# Patient Record
Sex: FEMALE | Race: WHITE | Employment: UNEMPLOYED | URBAN - METROPOLITAN AREA
[De-identification: names, ages, dates, MRNs, and addresses within clinical notes are randomized per-mention and may not be internally consistent; named-entity substitution may affect disease eponyms.]

---

## 2017-01-05 ENCOUNTER — TELEPHONE (OUTPATIENT)
Dept: CARDIOLOGY CLINIC | Age: 62
End: 2017-01-05

## 2017-04-17 ENCOUNTER — OFFICE VISIT (OUTPATIENT)
Dept: CARDIOLOGY CLINIC | Age: 62
End: 2017-04-17

## 2017-04-17 VITALS
WEIGHT: 192 LBS | DIASTOLIC BLOOD PRESSURE: 84 MMHG | HEART RATE: 60 BPM | SYSTOLIC BLOOD PRESSURE: 120 MMHG | HEIGHT: 61 IN | BODY MASS INDEX: 36.25 KG/M2

## 2017-04-17 DIAGNOSIS — R06.09 DOE (DYSPNEA ON EXERTION): ICD-10-CM

## 2017-04-17 DIAGNOSIS — I10 ESSENTIAL HYPERTENSION: ICD-10-CM

## 2017-04-17 DIAGNOSIS — R53.82 CHRONIC FATIGUE: ICD-10-CM

## 2017-04-17 DIAGNOSIS — E78.2 MIXED HYPERLIPIDEMIA: ICD-10-CM

## 2017-04-17 DIAGNOSIS — I48.0 PAF (PAROXYSMAL ATRIAL FIBRILLATION) (HCC): Primary | ICD-10-CM

## 2017-04-17 PROCEDURE — 99213 OFFICE O/P EST LOW 20 MIN: CPT | Performed by: INTERNAL MEDICINE

## 2017-04-17 PROCEDURE — 93000 ELECTROCARDIOGRAM COMPLETE: CPT | Performed by: INTERNAL MEDICINE

## 2018-10-12 ENCOUNTER — HOSPITAL ENCOUNTER (OUTPATIENT)
Age: 63
Setting detail: OBSERVATION
Discharge: PSYCHIATRIC OR SUBSTANCE USE FACILITY | End: 2018-10-14
Attending: EMERGENCY MEDICINE | Admitting: HOSPITALIST
Payer: COMMERCIAL

## 2018-10-12 DIAGNOSIS — T50.902A SUICIDE ATTEMPT BY DRUG INGESTION, INITIAL ENCOUNTER (HCC): Primary | ICD-10-CM

## 2018-10-12 DIAGNOSIS — N39.0 URINARY TRACT INFECTION WITHOUT HEMATURIA, SITE UNSPECIFIED: ICD-10-CM

## 2018-10-12 DIAGNOSIS — T50.902A INTENTIONAL DRUG OVERDOSE, INITIAL ENCOUNTER (HCC): ICD-10-CM

## 2018-10-12 DIAGNOSIS — F10.920 ACUTE ALCOHOLIC INTOXICATION WITHOUT COMPLICATION (HCC): ICD-10-CM

## 2018-10-12 DIAGNOSIS — K85.00 IDIOPATHIC ACUTE PANCREATITIS WITHOUT INFECTION OR NECROSIS: ICD-10-CM

## 2018-10-12 PROBLEM — T44.1X1A: Status: ACTIVE | Noted: 2018-10-12

## 2018-10-12 LAB
ACETAMINOPHEN LEVEL: <5 UG/ML (ref 15–30)
ALBUMIN SERPL-MCNC: 4.4 GM/DL (ref 3.4–5)
ALCOHOL SCREEN SERUM: 0.04 %WT/VOL
ALP BLD-CCNC: 89 IU/L (ref 40–129)
ALT SERPL-CCNC: 16 U/L (ref 10–40)
AMPHETAMINES: NEGATIVE
ANION GAP SERPL CALCULATED.3IONS-SCNC: 22 MMOL/L (ref 4–16)
AST SERPL-CCNC: 16 IU/L (ref 15–37)
BACTERIA: ABNORMAL /HPF
BARBITURATE SCREEN URINE: NEGATIVE
BASOPHILS ABSOLUTE: 0.1 K/CU MM
BASOPHILS RELATIVE PERCENT: 0.8 % (ref 0–1)
BENZODIAZEPINE SCREEN, URINE: NEGATIVE
BILIRUB SERPL-MCNC: 0.3 MG/DL (ref 0–1)
BILIRUBIN URINE: NEGATIVE MG/DL
BLOOD, URINE: NEGATIVE
BUN BLDV-MCNC: 23 MG/DL (ref 6–23)
CALCIUM SERPL-MCNC: 9 MG/DL (ref 8.3–10.6)
CANNABINOID SCREEN URINE: NEGATIVE
CAST TYPE: ABNORMAL /HPF
CHLORIDE BLD-SCNC: 102 MMOL/L (ref 99–110)
CLARITY: CLEAR
CO2: 15 MMOL/L (ref 21–32)
COCAINE METABOLITE: NEGATIVE
COLOR: YELLOW
CREAT SERPL-MCNC: 1 MG/DL (ref 0.6–1.1)
CRYSTAL TYPE: ABNORMAL /HPF
DIFFERENTIAL TYPE: ABNORMAL
EOSINOPHILS ABSOLUTE: 0.1 K/CU MM
EOSINOPHILS RELATIVE PERCENT: 1.8 % (ref 0–3)
EPITHELIAL CELLS, UA: ABNORMAL /HPF
GFR AFRICAN AMERICAN: >60 ML/MIN/1.73M2
GFR NON-AFRICAN AMERICAN: 56 ML/MIN/1.73M2
GLUCOSE BLD-MCNC: 152 MG/DL (ref 70–99)
GLUCOSE, URINE: NEGATIVE MG/DL
HCT VFR BLD CALC: 35.6 % (ref 37–47)
HEMOGLOBIN: 11.5 GM/DL (ref 12.5–16)
IMMATURE NEUTROPHIL %: 0.4 % (ref 0–0.43)
KETONES, URINE: NEGATIVE MG/DL
LEUKOCYTE ESTERASE, URINE: ABNORMAL
LIPASE: 143 IU/L (ref 13–60)
LYMPHOCYTES ABSOLUTE: 1.9 K/CU MM
LYMPHOCYTES RELATIVE PERCENT: 25.2 % (ref 24–44)
MCH RBC QN AUTO: 28.8 PG (ref 27–31)
MCHC RBC AUTO-ENTMCNC: 32.3 % (ref 32–36)
MCV RBC AUTO: 89.2 FL (ref 78–100)
MONOCYTES ABSOLUTE: 0.4 K/CU MM
MONOCYTES RELATIVE PERCENT: 4.7 % (ref 0–4)
MUCUS: NEGATIVE HPF
NITRITE URINE, QUANTITATIVE: NEGATIVE
OPIATES, URINE: NEGATIVE
OXYCODONE: NEGATIVE
PDW BLD-RTO: 12.4 % (ref 11.7–14.9)
PH, URINE: 5.5 (ref 5–8)
PHENCYCLIDINE, URINE: NEGATIVE
PLATELET # BLD: 184 K/CU MM (ref 140–440)
PMV BLD AUTO: 9.9 FL (ref 7.5–11.1)
POTASSIUM SERPL-SCNC: 4.1 MMOL/L (ref 3.5–5.1)
PROTEIN UA: NEGATIVE MG/DL
RBC # BLD: 3.99 M/CU MM (ref 4.2–5.4)
RBC URINE: ABNORMAL /HPF (ref 0–6)
SALICYLATE LEVEL: <0.3 MG/DL (ref 15–30)
SEGMENTED NEUTROPHILS ABSOLUTE COUNT: 4.9 K/CU MM
SEGMENTED NEUTROPHILS RELATIVE PERCENT: 67.1 % (ref 36–66)
SODIUM BLD-SCNC: 139 MMOL/L (ref 135–145)
SPECIFIC GRAVITY UA: 1 (ref 1–1.03)
TOTAL IMMATURE NEUTOROPHIL: 0.03 K/CU MM
TOTAL PROTEIN: 7.2 GM/DL (ref 6.4–8.2)
TSH HIGH SENSITIVITY: 1.13 UIU/ML (ref 0.27–4.2)
UROBILINOGEN, URINE: 0.2 MG/DL (ref 0.2–1)
VOLUME, (UVOL): 12 ML (ref 10–12)
WBC # BLD: 7.4 K/CU MM (ref 4–10.5)
WBC UA: ABNORMAL /HPF (ref 0–5)

## 2018-10-12 PROCEDURE — 99291 CRITICAL CARE FIRST HOUR: CPT

## 2018-10-12 PROCEDURE — 80307 DRUG TEST PRSMV CHEM ANLYZR: CPT

## 2018-10-12 PROCEDURE — 80053 COMPREHEN METABOLIC PANEL: CPT

## 2018-10-12 PROCEDURE — 6360000002 HC RX W HCPCS: Performed by: EMERGENCY MEDICINE

## 2018-10-12 PROCEDURE — 83690 ASSAY OF LIPASE: CPT

## 2018-10-12 PROCEDURE — 85025 COMPLETE CBC W/AUTO DIFF WBC: CPT

## 2018-10-12 PROCEDURE — 2580000003 HC RX 258: Performed by: EMERGENCY MEDICINE

## 2018-10-12 PROCEDURE — 6370000000 HC RX 637 (ALT 250 FOR IP)

## 2018-10-12 PROCEDURE — 96365 THER/PROPH/DIAG IV INF INIT: CPT

## 2018-10-12 PROCEDURE — 93005 ELECTROCARDIOGRAM TRACING: CPT | Performed by: EMERGENCY MEDICINE

## 2018-10-12 PROCEDURE — 84443 ASSAY THYROID STIM HORMONE: CPT

## 2018-10-12 PROCEDURE — 81001 URINALYSIS AUTO W/SCOPE: CPT

## 2018-10-12 PROCEDURE — G0480 DRUG TEST DEF 1-7 CLASSES: HCPCS

## 2018-10-12 PROCEDURE — G0378 HOSPITAL OBSERVATION PER HR: HCPCS

## 2018-10-12 PROCEDURE — 6370000000 HC RX 637 (ALT 250 FOR IP): Performed by: EMERGENCY MEDICINE

## 2018-10-12 RX ORDER — ACTIVATED CHARCOAL 208 MG/ML
25 SUSPENSION ORAL ONCE
Status: COMPLETED | OUTPATIENT
Start: 2018-10-12 | End: 2018-10-12

## 2018-10-12 RX ORDER — SODIUM CHLORIDE 9 MG/ML
INJECTION, SOLUTION INTRAVENOUS CONTINUOUS
Status: DISCONTINUED | OUTPATIENT
Start: 2018-10-12 | End: 2018-10-14 | Stop reason: HOSPADM

## 2018-10-12 RX ADMIN — POISON ADSORBENT 25 G: 50 SUSPENSION ORAL at 21:45

## 2018-10-12 RX ADMIN — ACTIVATED CHARCOAL 25 G: 208 SUSPENSION ORAL at 22:28

## 2018-10-12 RX ADMIN — CEFTRIAXONE SODIUM 1 G: 1 INJECTION, POWDER, FOR SOLUTION INTRAMUSCULAR; INTRAVENOUS at 23:18

## 2018-10-12 RX ADMIN — SODIUM CHLORIDE: 9 INJECTION, SOLUTION INTRAVENOUS at 22:38

## 2018-10-13 LAB
ALBUMIN SERPL-MCNC: 3.9 GM/DL (ref 3.4–5)
ALP BLD-CCNC: 76 IU/L (ref 40–129)
ALT SERPL-CCNC: 14 U/L (ref 10–40)
ANION GAP SERPL CALCULATED.3IONS-SCNC: 15 MMOL/L (ref 4–16)
AST SERPL-CCNC: 14 IU/L (ref 15–37)
BILIRUB SERPL-MCNC: 0.2 MG/DL (ref 0–1)
BUN BLDV-MCNC: 20 MG/DL (ref 6–23)
CALCIUM SERPL-MCNC: 8.6 MG/DL (ref 8.3–10.6)
CHLORIDE BLD-SCNC: 109 MMOL/L (ref 99–110)
CO2: 20 MMOL/L (ref 21–32)
CREAT SERPL-MCNC: 0.9 MG/DL (ref 0.6–1.1)
EKG ATRIAL RATE: 102 BPM
EKG DIAGNOSIS: NORMAL
EKG P AXIS: 27 DEGREES
EKG P-R INTERVAL: 156 MS
EKG Q-T INTERVAL: 416 MS
EKG QRS DURATION: 76 MS
EKG QTC CALCULATION (BAZETT): 542 MS
EKG R AXIS: -11 DEGREES
EKG T AXIS: 27 DEGREES
EKG VENTRICULAR RATE: 102 BPM
ESTIMATED AVERAGE GLUCOSE: 117 MG/DL
GFR AFRICAN AMERICAN: >60 ML/MIN/1.73M2
GFR NON-AFRICAN AMERICAN: >60 ML/MIN/1.73M2
GLUCOSE BLD-MCNC: 71 MG/DL (ref 70–99)
GLUCOSE BLD-MCNC: 82 MG/DL (ref 70–99)
GLUCOSE BLD-MCNC: 88 MG/DL (ref 70–99)
GLUCOSE BLD-MCNC: 92 MG/DL (ref 70–99)
GLUCOSE BLD-MCNC: 92 MG/DL (ref 70–99)
GLUCOSE BLD-MCNC: 97 MG/DL (ref 70–99)
HBA1C MFR BLD: 5.7 % (ref 4.2–6.3)
HCT VFR BLD CALC: 32.8 % (ref 37–47)
HEMOGLOBIN: 10.4 GM/DL (ref 12.5–16)
LIPASE: 108 IU/L (ref 13–60)
MCH RBC QN AUTO: 28.6 PG (ref 27–31)
MCHC RBC AUTO-ENTMCNC: 31.7 % (ref 32–36)
MCV RBC AUTO: 90.1 FL (ref 78–100)
PDW BLD-RTO: 12.4 % (ref 11.7–14.9)
PLATELET # BLD: 169 K/CU MM (ref 140–440)
PMV BLD AUTO: 9.8 FL (ref 7.5–11.1)
POTASSIUM SERPL-SCNC: 4.4 MMOL/L (ref 3.5–5.1)
RBC # BLD: 3.64 M/CU MM (ref 4.2–5.4)
SODIUM BLD-SCNC: 144 MMOL/L (ref 135–145)
TOTAL PROTEIN: 6.4 GM/DL (ref 6.4–8.2)
WBC # BLD: 6.1 K/CU MM (ref 4–10.5)

## 2018-10-13 PROCEDURE — 93010 ELECTROCARDIOGRAM REPORT: CPT | Performed by: INTERNAL MEDICINE

## 2018-10-13 PROCEDURE — G0378 HOSPITAL OBSERVATION PER HR: HCPCS

## 2018-10-13 PROCEDURE — 6360000002 HC RX W HCPCS: Performed by: NURSE PRACTITIONER

## 2018-10-13 PROCEDURE — 2580000003 HC RX 258: Performed by: NURSE PRACTITIONER

## 2018-10-13 PROCEDURE — 82962 GLUCOSE BLOOD TEST: CPT

## 2018-10-13 PROCEDURE — 6370000000 HC RX 637 (ALT 250 FOR IP): Performed by: NURSE PRACTITIONER

## 2018-10-13 PROCEDURE — 96375 TX/PRO/DX INJ NEW DRUG ADDON: CPT

## 2018-10-13 PROCEDURE — 2580000003 HC RX 258: Performed by: EMERGENCY MEDICINE

## 2018-10-13 PROCEDURE — 83036 HEMOGLOBIN GLYCOSYLATED A1C: CPT

## 2018-10-13 PROCEDURE — 2500000003 HC RX 250 WO HCPCS: Performed by: NURSE PRACTITIONER

## 2018-10-13 PROCEDURE — 85027 COMPLETE CBC AUTOMATED: CPT

## 2018-10-13 PROCEDURE — 80053 COMPREHEN METABOLIC PANEL: CPT

## 2018-10-13 PROCEDURE — 36415 COLL VENOUS BLD VENIPUNCTURE: CPT

## 2018-10-13 PROCEDURE — 87086 URINE CULTURE/COLONY COUNT: CPT

## 2018-10-13 PROCEDURE — 83690 ASSAY OF LIPASE: CPT

## 2018-10-13 RX ORDER — ACETAMINOPHEN 325 MG/1
650 TABLET ORAL EVERY 4 HOURS PRN
Status: DISCONTINUED | OUTPATIENT
Start: 2018-10-13 | End: 2018-10-14 | Stop reason: HOSPADM

## 2018-10-13 RX ORDER — DEXTROSE MONOHYDRATE 25 G/50ML
12.5 INJECTION, SOLUTION INTRAVENOUS PRN
Status: DISCONTINUED | OUTPATIENT
Start: 2018-10-13 | End: 2018-10-14 | Stop reason: HOSPADM

## 2018-10-13 RX ORDER — LISINOPRIL 40 MG/1
40 TABLET ORAL DAILY
Status: DISCONTINUED | OUTPATIENT
Start: 2018-10-13 | End: 2018-10-14 | Stop reason: HOSPADM

## 2018-10-13 RX ORDER — SODIUM CHLORIDE 0.9 % (FLUSH) 0.9 %
10 SYRINGE (ML) INJECTION EVERY 12 HOURS SCHEDULED
Status: DISCONTINUED | OUTPATIENT
Start: 2018-10-13 | End: 2018-10-14 | Stop reason: HOSPADM

## 2018-10-13 RX ORDER — DEXTROSE MONOHYDRATE 50 MG/ML
100 INJECTION, SOLUTION INTRAVENOUS PRN
Status: DISCONTINUED | OUTPATIENT
Start: 2018-10-13 | End: 2018-10-14 | Stop reason: HOSPADM

## 2018-10-13 RX ORDER — NICOTINE POLACRILEX 4 MG
15 LOZENGE BUCCAL PRN
Status: DISCONTINUED | OUTPATIENT
Start: 2018-10-13 | End: 2018-10-14 | Stop reason: HOSPADM

## 2018-10-13 RX ORDER — HYDROCHLOROTHIAZIDE 25 MG/1
12.5 TABLET ORAL DAILY
Status: DISCONTINUED | OUTPATIENT
Start: 2018-10-13 | End: 2018-10-14 | Stop reason: HOSPADM

## 2018-10-13 RX ORDER — FAMOTIDINE 20 MG/1
20 TABLET, FILM COATED ORAL DAILY
Status: DISCONTINUED | OUTPATIENT
Start: 2018-10-13 | End: 2018-10-14 | Stop reason: HOSPADM

## 2018-10-13 RX ORDER — ATORVASTATIN CALCIUM 40 MG/1
40 TABLET, FILM COATED ORAL DAILY
Status: DISCONTINUED | OUTPATIENT
Start: 2018-10-13 | End: 2018-10-14 | Stop reason: HOSPADM

## 2018-10-13 RX ORDER — ONDANSETRON 2 MG/ML
4 INJECTION INTRAMUSCULAR; INTRAVENOUS EVERY 6 HOURS PRN
Status: DISCONTINUED | OUTPATIENT
Start: 2018-10-13 | End: 2018-10-14 | Stop reason: HOSPADM

## 2018-10-13 RX ORDER — SODIUM CHLORIDE 0.9 % (FLUSH) 0.9 %
10 SYRINGE (ML) INJECTION PRN
Status: DISCONTINUED | OUTPATIENT
Start: 2018-10-13 | End: 2018-10-14 | Stop reason: HOSPADM

## 2018-10-13 RX ADMIN — FOLIC ACID: 5 INJECTION, SOLUTION INTRAMUSCULAR; INTRAVENOUS; SUBCUTANEOUS at 12:00

## 2018-10-13 RX ADMIN — SERTRALINE HYDROCHLORIDE 50 MG: 50 TABLET ORAL at 09:38

## 2018-10-13 RX ADMIN — HYDROCHLOROTHIAZIDE 12.5 MG: 25 TABLET ORAL at 09:38

## 2018-10-13 RX ADMIN — LISINOPRIL 40 MG: 40 TABLET ORAL at 09:38

## 2018-10-13 RX ADMIN — SODIUM CHLORIDE: 9 INJECTION, SOLUTION INTRAVENOUS at 02:04

## 2018-10-13 RX ADMIN — METFORMIN HYDROCHLORIDE 850 MG: 850 TABLET, FILM COATED ORAL at 09:38

## 2018-10-13 RX ADMIN — METFORMIN HYDROCHLORIDE 850 MG: 850 TABLET, FILM COATED ORAL at 17:36

## 2018-10-13 RX ADMIN — SODIUM CHLORIDE, PRESERVATIVE FREE 10 ML: 5 INJECTION INTRAVENOUS at 09:39

## 2018-10-13 RX ADMIN — FAMOTIDINE 20 MG: 20 TABLET ORAL at 09:38

## 2018-10-13 RX ADMIN — SODIUM CHLORIDE: 9 INJECTION, SOLUTION INTRAVENOUS at 09:42

## 2018-10-13 RX ADMIN — RIVAROXABAN 20 MG: 20 TABLET, FILM COATED ORAL at 17:36

## 2018-10-13 RX ADMIN — RIVAROXABAN 20 MG: 20 TABLET, FILM COATED ORAL at 01:28

## 2018-10-13 RX ADMIN — ATORVASTATIN CALCIUM 40 MG: 40 TABLET, FILM COATED ORAL at 09:38

## 2018-10-13 ASSESSMENT — LIFESTYLE VARIABLES: HISTORY_ALCOHOL_USE: NO

## 2018-10-13 ASSESSMENT — PAIN SCALES - GENERAL
PAINLEVEL_OUTOF10: 0

## 2018-10-13 ASSESSMENT — SLEEP AND FATIGUE QUESTIONNAIRES
RESTFUL SLEEP: NO
DIFFICULTY STAYING ASLEEP: YES
DO YOU HAVE DIFFICULTY SLEEPING: YES
DIFFICULTY ARISING: YES
DO YOU USE A SLEEP AID: NO
DIFFICULTY FALLING ASLEEP: YES
SLEEP PATTERN: DIFFICULTY FALLING ASLEEP

## 2018-10-13 ASSESSMENT — PATIENT HEALTH QUESTIONNAIRE - PHQ9: SUM OF ALL RESPONSES TO PHQ QUESTIONS 1-9: 14

## 2018-10-13 NOTE — ED PROVIDER NOTES
hemodynamically significant flow disturbance.  H/O echocardiogram 16    Normal left ventricular systolic function. Mild concentric LVh EF 55 to 60%     H/O exercise stress test 3/18/16     Normal test. No ischemia or arrhythmia.  History of cardiac monitoring 16    Normal event monitor findings suggestive of SR with mild degree of sinus tachycardia on April 3.    HTN (hypertension)     Hx-TIA (transient ischemic attack) 3/11/2016    2016 [facility]. MRI negative    Hyperlipidemia     Major depression 2015    PAF (paroxysmal atrial fibrillation) ([facility] [address] 3/11/2016    Telemetry strips 16 [facility]     Past Surgical History:   Procedure Laterality Date    BACK SURGERY       SECTION       Family History   Problem Relation Age of Onset    Diabetes Mother     Hypertension Father     High Cholesterol Father     Diabetes Father     Thyroid Disease Sister     Heart Disease Daughter          7 weeks old     Social History     Social History    Marital status:      Spouse name: N/A    Number of children: N/A    Years of education: N/A     Occupational History    Not on file.      Social History Main Topics    Smoking status: Never Smoker    Smokeless tobacco: Never Used    Alcohol use Yes      Comment: occasional    Drug use: No    Sexual activity: Yes     Partners: Male     Other Topics Concern    Not on file     Social History Narrative    No narrative on file     Current Facility-Administered Medications   Medication Dose Route Frequency Provider Last Rate Last Dose    0.9 % sodium chloride infusion   Intravenous Continuous Nichol Urbina  mL/hr at 10/12/18 0421      cefTRIAXone (ROCEPHIN) 1 g IVPB in 50 mL D5W minibag  1 g Intravenous Once Lida Guerrero DO         Current Outpatient Prescriptions   Medication Sig Dispense Refill    medroxyPROGESTERone (PROVERA) 10 MG tablet 1 tab PO QD 5 tablet 0    hydrochlorothiazide the chart, and lab studies. This critical care time does not include any billable procedures. Clinical Impression:  1. Suicide attempt by drug ingestion, initial encounter ([facility] [address]    2. Intentional drug overdose, initial encounter ([facility]    3. Idiopathic acute pancreatitis without infection or necrosis    4. Acute alcoholic intoxication without complication ([facility]    5. Urinary tract infection without hematuria, site unspecified      Disposition referral (if applicable):  No follow-up provider specified. Disposition medications (if applicable):  New Prescriptions    No medications on file       Comment: Please note this report has been produced using speech recognition software and may contain errors related to that system including errors in grammar, punctuation, and spelling, as well as words and phrases that may be inappropriate. If there are any questions or concerns please feel free to contact the dictating provider for clarification.         [address] Tita, DO  10/12/18 2329

## 2018-10-13 NOTE — ED NOTES
Pt states that she wants some sleeping pills and wants to go to sleep.      Maddy Roca RN  10/12/18 9580

## 2018-10-14 VITALS
TEMPERATURE: 97 F | WEIGHT: 173 LBS | HEIGHT: 61 IN | OXYGEN SATURATION: 97 % | DIASTOLIC BLOOD PRESSURE: 77 MMHG | BODY MASS INDEX: 32.66 KG/M2 | RESPIRATION RATE: 12 BRPM | SYSTOLIC BLOOD PRESSURE: 142 MMHG | HEART RATE: 54 BPM

## 2018-10-14 LAB
ANION GAP SERPL CALCULATED.3IONS-SCNC: 16 MMOL/L (ref 4–16)
BUN BLDV-MCNC: 17 MG/DL (ref 6–23)
CALCIUM SERPL-MCNC: 8 MG/DL (ref 8.3–10.6)
CHLORIDE BLD-SCNC: 111 MMOL/L (ref 99–110)
CO2: 18 MMOL/L (ref 21–32)
CREAT SERPL-MCNC: 0.9 MG/DL (ref 0.6–1.1)
EKG ATRIAL RATE: 57 BPM
EKG DIAGNOSIS: NORMAL
EKG P AXIS: 49 DEGREES
EKG P-R INTERVAL: 180 MS
EKG Q-T INTERVAL: 428 MS
EKG QRS DURATION: 80 MS
EKG QTC CALCULATION (BAZETT): 416 MS
EKG R AXIS: -7 DEGREES
EKG T AXIS: 23 DEGREES
EKG VENTRICULAR RATE: 57 BPM
GFR AFRICAN AMERICAN: >60 ML/MIN/1.73M2
GFR NON-AFRICAN AMERICAN: >60 ML/MIN/1.73M2
GLUCOSE BLD-MCNC: 87 MG/DL (ref 70–99)
GLUCOSE BLD-MCNC: 88 MG/DL (ref 70–99)
LIPASE: 60 IU/L (ref 13–60)
POTASSIUM SERPL-SCNC: 4 MMOL/L (ref 3.5–5.1)
SODIUM BLD-SCNC: 145 MMOL/L (ref 135–145)

## 2018-10-14 PROCEDURE — 83690 ASSAY OF LIPASE: CPT

## 2018-10-14 PROCEDURE — 2580000003 HC RX 258: Performed by: EMERGENCY MEDICINE

## 2018-10-14 PROCEDURE — 93005 ELECTROCARDIOGRAM TRACING: CPT | Performed by: HOSPITALIST

## 2018-10-14 PROCEDURE — 82962 GLUCOSE BLOOD TEST: CPT

## 2018-10-14 PROCEDURE — 6370000000 HC RX 637 (ALT 250 FOR IP): Performed by: NURSE PRACTITIONER

## 2018-10-14 PROCEDURE — G0378 HOSPITAL OBSERVATION PER HR: HCPCS

## 2018-10-14 PROCEDURE — 36415 COLL VENOUS BLD VENIPUNCTURE: CPT

## 2018-10-14 PROCEDURE — 80048 BASIC METABOLIC PNL TOTAL CA: CPT

## 2018-10-14 PROCEDURE — 93010 ELECTROCARDIOGRAM REPORT: CPT | Performed by: INTERNAL MEDICINE

## 2018-10-14 RX ADMIN — HYDROCHLOROTHIAZIDE 12.5 MG: 25 TABLET ORAL at 08:36

## 2018-10-14 RX ADMIN — METFORMIN HYDROCHLORIDE 850 MG: 850 TABLET, FILM COATED ORAL at 08:36

## 2018-10-14 RX ADMIN — LISINOPRIL 40 MG: 40 TABLET ORAL at 08:36

## 2018-10-14 RX ADMIN — SERTRALINE HYDROCHLORIDE 50 MG: 50 TABLET ORAL at 08:37

## 2018-10-14 RX ADMIN — SODIUM CHLORIDE: 9 INJECTION, SOLUTION INTRAVENOUS at 02:54

## 2018-10-14 RX ADMIN — ATORVASTATIN CALCIUM 40 MG: 40 TABLET, FILM COATED ORAL at 08:36

## 2018-10-14 RX ADMIN — FAMOTIDINE 20 MG: 20 TABLET ORAL at 08:36

## 2018-10-14 ASSESSMENT — PAIN SCALES - GENERAL
PAINLEVEL_OUTOF10: 0

## 2018-10-14 NOTE — DISCHARGE SUMMARY
elevated lipase, which was likely due to her alcohol use. She was not having any abdominal pain or nausea or vomiting. This was likely not pancreatitis. She was tolerating her diet. Lipase was improving during the course of her stay. Patient had an abnormal UA. Leukocyte esterase was positive. However patient did not have any dysuria, nor bladder tenderness. She likely had asymptomatic bacteriuria. All antibiotics were stopped. Patient had diabetes, and was on metformin and sliding scale insulin. Her hemoglobin A1c was 5.7. Patient was stable and she was transferred to inpatient mental health. Physical Exam:  BP (!) 142/77   Pulse 56   Temp 97 °F (36.1 °C) (Temporal)   Resp 12   Ht 5' 1\" (1.549 m)   Wt 173 lb (78.5 kg)   LMP 01/14/2015 (Approximate)   SpO2 98%   Breastfeeding? No   BMI 32.69 kg/m²   CONSTITUTIONAL:  No acute distress  HENT:  NCAT  LUNGS:  cta b/l bs+  CARDIOVASCULAR:  s1s2 rrr  ABDOMEN:  Soft nt nd  MUSCULOSKELETAL/Extremities:  No edema, nontender  NEUROLOGIC:  No gross deficits, aao  SKIN:  No gross lesions    Consultations  IP CONSULT TO PSYCHIATRY  IP CONSULT TO SOCIAL WORK    Invasive procedures:   none    Significant Diagnostic Studies:    Imaging  No results found. Code Status:  Full Code      Discharge Medications:     Medication List      CONTINUE taking these medications    atorvastatin 40 MG tablet  Commonly known as:  LIPITOR  Take 1 tablet by mouth daily. hydrochlorothiazide 12.5 MG tablet  Commonly known as:  HYDRODIURIL  Take 1 tablet by mouth daily     lisinopril 40 MG tablet  Commonly known as:  PRINIVIL;ZESTRIL  Take 1 tablet by mouth daily. metFORMIN 850 MG tablet  Commonly known as:  GLUCOPHAGE  Take 1 tablet by mouth 2 times daily (with meals).      rivaroxaban 20 MG Tabs tablet  Commonly known as:  XARELTO     sertraline 50 MG tablet  Commonly known as:  ZOLOFT        STOP taking these medications    medroxyPROGESTERone 10 MG

## 2018-10-14 NOTE — FLOWSHEET NOTE
Received approval from [facility] Tzee per Sharla Ware that patient was approved for a bed. Per Dr. Rachel Titus we are to continue the plan to place as an inpatient and patient continues to be pink slipped. Felipa supervisor notified. [facility] notified of approval and asked to set up transportation. Tanesha Teran will call back with more information.
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
## 2018-10-15 LAB
CULTURE: NORMAL
Lab: NORMAL
REPORT STATUS: NORMAL
SPECIMEN: NORMAL

## 2021-08-26 ENCOUNTER — HOSPITAL ENCOUNTER (INPATIENT)
Age: 66
LOS: 2 days | Discharge: HOME | DRG: 392 | End: 2021-08-28
Attending: EMERGENCY MEDICINE | Admitting: HOSPITALIST
Payer: MEDICARE

## 2021-08-26 ENCOUNTER — APPOINTMENT (OUTPATIENT)
Dept: CT IMAGING | Age: 66
DRG: 392 | End: 2021-08-26
Payer: MEDICARE

## 2021-08-26 DIAGNOSIS — K57.92 ACUTE DIVERTICULITIS: Primary | ICD-10-CM

## 2021-08-26 DIAGNOSIS — R11.2 NAUSEA AND VOMITING, INTRACTABILITY OF VOMITING NOT SPECIFIED, UNSPECIFIED VOMITING TYPE: ICD-10-CM

## 2021-08-26 LAB
ALBUMIN SERPL-MCNC: 4 GM/DL (ref 3.4–5)
ALP BLD-CCNC: 75 IU/L (ref 40–129)
ALT SERPL-CCNC: 24 U/L (ref 10–40)
ANION GAP SERPL CALCULATED.3IONS-SCNC: 20 MMOL/L (ref 4–16)
AST SERPL-CCNC: 24 IU/L (ref 15–37)
BACTERIA: ABNORMAL /HPF
BASOPHILS ABSOLUTE: 0 K/CU MM
BASOPHILS RELATIVE PERCENT: 0.2 % (ref 0–1)
BILIRUB SERPL-MCNC: 0.4 MG/DL (ref 0–1)
BILIRUBIN URINE: NEGATIVE MG/DL
BLOOD, URINE: NEGATIVE
BUN BLDV-MCNC: 42 MG/DL (ref 6–23)
CALCIUM SERPL-MCNC: 8.6 MG/DL (ref 8.3–10.6)
CAST TYPE: ABNORMAL /HPF
CHLORIDE BLD-SCNC: 92 MMOL/L (ref 99–110)
CLARITY: ABNORMAL
CO2: 20 MMOL/L (ref 21–32)
COLOR: YELLOW
CREAT SERPL-MCNC: 1.5 MG/DL (ref 0.6–1.1)
CRYSTAL TYPE: ABNORMAL /HPF
DIFFERENTIAL TYPE: ABNORMAL
EOSINOPHILS ABSOLUTE: 0.3 K/CU MM
EOSINOPHILS RELATIVE PERCENT: 3.2 % (ref 0–3)
EPITHELIAL CELLS, UA: ABNORMAL /HPF
GFR AFRICAN AMERICAN: 42 ML/MIN/1.73M2
GFR NON-AFRICAN AMERICAN: 35 ML/MIN/1.73M2
GLUCOSE BLD-MCNC: 171 MG/DL (ref 70–99)
GLUCOSE, URINE: NEGATIVE MG/DL
HCT VFR BLD CALC: 32.8 % (ref 37–47)
HEMOGLOBIN: 10.8 GM/DL (ref 12.5–16)
IMMATURE NEUTROPHIL %: 0.6 % (ref 0–0.43)
KETONES, URINE: ABNORMAL MG/DL
LACTATE: 1.8 MMOL/L (ref 0.4–2)
LEUKOCYTE ESTERASE, URINE: ABNORMAL
LIPASE: 19 IU/L (ref 13–60)
LYMPHOCYTES ABSOLUTE: 0.4 K/CU MM
LYMPHOCYTES RELATIVE PERCENT: 4.2 % (ref 24–44)
MCH RBC QN AUTO: 28 PG (ref 27–31)
MCHC RBC AUTO-ENTMCNC: 32.9 % (ref 32–36)
MCV RBC AUTO: 85 FL (ref 78–100)
MONOCYTES ABSOLUTE: 0.3 K/CU MM
MONOCYTES RELATIVE PERCENT: 3.3 % (ref 0–4)
MUCUS: NEGATIVE HPF
NITRITE URINE, QUANTITATIVE: NEGATIVE
PDW BLD-RTO: 14.7 % (ref 11.7–14.9)
PH, URINE: 5 (ref 5–8)
PLATELET # BLD: 211 K/CU MM (ref 140–440)
PMV BLD AUTO: 10.4 FL (ref 7.5–11.1)
POTASSIUM SERPL-SCNC: 3.7 MMOL/L (ref 3.5–5.1)
PROTEIN UA: ABNORMAL MG/DL
RBC # BLD: 3.86 M/CU MM (ref 4.2–5.4)
RBC URINE: ABNORMAL /HPF (ref 0–6)
SEGMENTED NEUTROPHILS ABSOLUTE COUNT: 7.8 K/CU MM
SEGMENTED NEUTROPHILS RELATIVE PERCENT: 88.5 % (ref 36–66)
SODIUM BLD-SCNC: 132 MMOL/L (ref 135–145)
SPECIFIC GRAVITY UA: 1.01 (ref 1–1.03)
TOTAL IMMATURE NEUTOROPHIL: 0.05 K/CU MM
TOTAL PROTEIN: 6.6 GM/DL (ref 6.4–8.2)
UROBILINOGEN, URINE: 0.2 MG/DL (ref 0.2–1)
VOLUME, (UVOL): 12 ML (ref 10–12)
WBC # BLD: 8.8 K/CU MM (ref 4–10.5)
WBC UA: ABNORMAL /HPF (ref 0–5)

## 2021-08-26 PROCEDURE — 6360000002 HC RX W HCPCS: Performed by: EMERGENCY MEDICINE

## 2021-08-26 PROCEDURE — 1200000000 HC SEMI PRIVATE

## 2021-08-26 PROCEDURE — 96375 TX/PRO/DX INJ NEW DRUG ADDON: CPT

## 2021-08-26 PROCEDURE — G0378 HOSPITAL OBSERVATION PER HR: HCPCS

## 2021-08-26 PROCEDURE — 2500000003 HC RX 250 WO HCPCS: Performed by: EMERGENCY MEDICINE

## 2021-08-26 PROCEDURE — 80053 COMPREHEN METABOLIC PANEL: CPT

## 2021-08-26 PROCEDURE — 99284 EMERGENCY DEPT VISIT MOD MDM: CPT

## 2021-08-26 PROCEDURE — 96365 THER/PROPH/DIAG IV INF INIT: CPT

## 2021-08-26 PROCEDURE — 96374 THER/PROPH/DIAG INJ IV PUSH: CPT

## 2021-08-26 PROCEDURE — 96368 THER/DIAG CONCURRENT INF: CPT

## 2021-08-26 PROCEDURE — 2580000003 HC RX 258: Performed by: EMERGENCY MEDICINE

## 2021-08-26 PROCEDURE — 96361 HYDRATE IV INFUSION ADD-ON: CPT

## 2021-08-26 PROCEDURE — 81001 URINALYSIS AUTO W/SCOPE: CPT

## 2021-08-26 PROCEDURE — 85025 COMPLETE CBC W/AUTO DIFF WBC: CPT

## 2021-08-26 PROCEDURE — 74176 CT ABD & PELVIS W/O CONTRAST: CPT

## 2021-08-26 PROCEDURE — 83605 ASSAY OF LACTIC ACID: CPT

## 2021-08-26 PROCEDURE — 83690 ASSAY OF LIPASE: CPT

## 2021-08-26 RX ORDER — ONDANSETRON 2 MG/ML
4 INJECTION INTRAMUSCULAR; INTRAVENOUS EVERY 6 HOURS PRN
Status: DISCONTINUED | OUTPATIENT
Start: 2021-08-26 | End: 2021-08-28 | Stop reason: HOSPADM

## 2021-08-26 RX ORDER — ATORVASTATIN CALCIUM 40 MG/1
40 TABLET, FILM COATED ORAL DAILY
Status: DISCONTINUED | OUTPATIENT
Start: 2021-08-27 | End: 2021-08-28 | Stop reason: HOSPADM

## 2021-08-26 RX ORDER — CIPROFLOXACIN 2 MG/ML
400 INJECTION, SOLUTION INTRAVENOUS ONCE
Status: COMPLETED | OUTPATIENT
Start: 2021-08-26 | End: 2021-08-27

## 2021-08-26 RX ORDER — 0.9 % SODIUM CHLORIDE 0.9 %
1000 INTRAVENOUS SOLUTION INTRAVENOUS ONCE
Status: COMPLETED | OUTPATIENT
Start: 2021-08-26 | End: 2021-08-26

## 2021-08-26 RX ORDER — ONDANSETRON 4 MG/1
4 TABLET, ORALLY DISINTEGRATING ORAL EVERY 8 HOURS PRN
Status: DISCONTINUED | OUTPATIENT
Start: 2021-08-26 | End: 2021-08-28 | Stop reason: HOSPADM

## 2021-08-26 RX ORDER — HYDROCHLOROTHIAZIDE 12.5 MG/1
12.5 CAPSULE, GELATIN COATED ORAL DAILY
Status: DISCONTINUED | OUTPATIENT
Start: 2021-08-27 | End: 2021-08-28 | Stop reason: HOSPADM

## 2021-08-26 RX ORDER — SODIUM CHLORIDE 9 MG/ML
25 INJECTION, SOLUTION INTRAVENOUS PRN
Status: DISCONTINUED | OUTPATIENT
Start: 2021-08-26 | End: 2021-08-28 | Stop reason: HOSPADM

## 2021-08-26 RX ORDER — ACETAMINOPHEN 325 MG/1
650 TABLET ORAL EVERY 6 HOURS PRN
Status: DISCONTINUED | OUTPATIENT
Start: 2021-08-26 | End: 2021-08-28 | Stop reason: HOSPADM

## 2021-08-26 RX ORDER — ONDANSETRON 2 MG/ML
4 INJECTION INTRAMUSCULAR; INTRAVENOUS ONCE
Status: COMPLETED | OUTPATIENT
Start: 2021-08-26 | End: 2021-08-26

## 2021-08-26 RX ORDER — LISINOPRIL 40 MG/1
40 TABLET ORAL DAILY
Status: DISCONTINUED | OUTPATIENT
Start: 2021-08-27 | End: 2021-08-28 | Stop reason: HOSPADM

## 2021-08-26 RX ORDER — METOCLOPRAMIDE HYDROCHLORIDE 5 MG/ML
10 INJECTION INTRAMUSCULAR; INTRAVENOUS ONCE
Status: COMPLETED | OUTPATIENT
Start: 2021-08-26 | End: 2021-08-27

## 2021-08-26 RX ORDER — SODIUM CHLORIDE 0.9 % (FLUSH) 0.9 %
5-40 SYRINGE (ML) INJECTION 2 TIMES DAILY
Status: DISCONTINUED | OUTPATIENT
Start: 2021-08-27 | End: 2021-08-28 | Stop reason: HOSPADM

## 2021-08-26 RX ORDER — SODIUM CHLORIDE 0.9 % (FLUSH) 0.9 %
5-40 SYRINGE (ML) INJECTION PRN
Status: DISCONTINUED | OUTPATIENT
Start: 2021-08-26 | End: 2021-08-28 | Stop reason: HOSPADM

## 2021-08-26 RX ORDER — POLYETHYLENE GLYCOL 3350 17 G/17G
17 POWDER, FOR SOLUTION ORAL DAILY PRN
Status: DISCONTINUED | OUTPATIENT
Start: 2021-08-26 | End: 2021-08-28 | Stop reason: HOSPADM

## 2021-08-26 RX ORDER — ACETAMINOPHEN 650 MG/1
650 SUPPOSITORY RECTAL EVERY 6 HOURS PRN
Status: DISCONTINUED | OUTPATIENT
Start: 2021-08-26 | End: 2021-08-28 | Stop reason: HOSPADM

## 2021-08-26 RX ADMIN — CIPROFLOXACIN 400 MG: 2 INJECTION, SOLUTION INTRAVENOUS at 23:10

## 2021-08-26 RX ADMIN — SODIUM CHLORIDE 1000 ML: 9 INJECTION, SOLUTION INTRAVENOUS at 20:31

## 2021-08-26 RX ADMIN — METRONIDAZOLE 500 MG: 500 INJECTION, SOLUTION INTRAVENOUS at 23:12

## 2021-08-26 RX ADMIN — ONDANSETRON 4 MG: 2 INJECTION INTRAMUSCULAR; INTRAVENOUS at 20:33

## 2021-08-26 ASSESSMENT — ENCOUNTER SYMPTOMS
SHORTNESS OF BREATH: 0
ALLERGIC/IMMUNOLOGIC NEGATIVE: 1
NAUSEA: 1
SORE THROAT: 1
EYES NEGATIVE: 1
VOMITING: 1
CHEST TIGHTNESS: 0
ABDOMINAL PAIN: 1

## 2021-08-26 ASSESSMENT — PAIN DESCRIPTION - LOCATION: LOCATION: THROAT

## 2021-08-26 ASSESSMENT — PAIN SCALES - GENERAL: PAINLEVEL_OUTOF10: 3

## 2021-08-26 ASSESSMENT — PAIN DESCRIPTION - PAIN TYPE: TYPE: ACUTE PAIN

## 2021-08-26 ASSESSMENT — PAIN DESCRIPTION - DESCRIPTORS: DESCRIPTORS: ACHING;BURNING

## 2021-08-26 ASSESSMENT — PAIN DESCRIPTION - FREQUENCY: FREQUENCY: CONTINUOUS

## 2021-08-26 NOTE — ED NOTES
Patient ambulated to the restroom and back with this nurse. Urine sample collected.       Mayelin Singletary RN  08/26/21 9208

## 2021-08-27 LAB
ANION GAP SERPL CALCULATED.3IONS-SCNC: 14 MMOL/L (ref 4–16)
BASOPHILS ABSOLUTE: 0 K/CU MM
BASOPHILS RELATIVE PERCENT: 0.2 % (ref 0–1)
BUN BLDV-MCNC: 36 MG/DL (ref 6–23)
CALCIUM SERPL-MCNC: 8.1 MG/DL (ref 8.3–10.6)
CHLORIDE BLD-SCNC: 97 MMOL/L (ref 99–110)
CHOLESTEROL: 217 MG/DL
CO2: 23 MMOL/L (ref 21–32)
CREAT SERPL-MCNC: 1.3 MG/DL (ref 0.6–1.1)
DIFFERENTIAL TYPE: ABNORMAL
EKG ATRIAL RATE: 68 BPM
EKG DIAGNOSIS: NORMAL
EKG P AXIS: 37 DEGREES
EKG P-R INTERVAL: 164 MS
EKG Q-T INTERVAL: 414 MS
EKG QRS DURATION: 84 MS
EKG QTC CALCULATION (BAZETT): 440 MS
EKG R AXIS: -6 DEGREES
EKG T AXIS: 18 DEGREES
EKG VENTRICULAR RATE: 68 BPM
EOSINOPHILS ABSOLUTE: 0.3 K/CU MM
EOSINOPHILS RELATIVE PERCENT: 5.1 % (ref 0–3)
ESTIMATED AVERAGE GLUCOSE: 126 MG/DL
GFR AFRICAN AMERICAN: 49 ML/MIN/1.73M2
GFR NON-AFRICAN AMERICAN: 41 ML/MIN/1.73M2
GLUCOSE BLD-MCNC: 106 MG/DL (ref 70–99)
GLUCOSE BLD-MCNC: 119 MG/DL (ref 70–99)
GLUCOSE BLD-MCNC: 120 MG/DL (ref 70–99)
GLUCOSE BLD-MCNC: 143 MG/DL (ref 70–99)
GLUCOSE BLD-MCNC: 164 MG/DL (ref 70–99)
GLUCOSE BLD-MCNC: 166 MG/DL (ref 70–99)
HBA1C MFR BLD: 6 % (ref 4.2–6.3)
HCT VFR BLD CALC: 27.4 % (ref 37–47)
HDLC SERPL-MCNC: 22 MG/DL
HEMOGLOBIN: 9 GM/DL (ref 12.5–16)
IMMATURE NEUTROPHIL %: 0.6 % (ref 0–0.43)
INR BLD: 1.02 INDEX
LDL CHOLESTEROL DIRECT: 124 MG/DL
LYMPHOCYTES ABSOLUTE: 0.5 K/CU MM
LYMPHOCYTES RELATIVE PERCENT: 7.3 % (ref 24–44)
MAGNESIUM: 1.8 MG/DL (ref 1.8–2.4)
MCH RBC QN AUTO: 27.8 PG (ref 27–31)
MCHC RBC AUTO-ENTMCNC: 32.8 % (ref 32–36)
MCV RBC AUTO: 84.6 FL (ref 78–100)
MONOCYTES ABSOLUTE: 0.4 K/CU MM
MONOCYTES RELATIVE PERCENT: 5.7 % (ref 0–4)
PDW BLD-RTO: 14.6 % (ref 11.7–14.9)
PLATELET # BLD: 186 K/CU MM (ref 140–440)
PMV BLD AUTO: 10.2 FL (ref 7.5–11.1)
POTASSIUM SERPL-SCNC: 3.5 MMOL/L (ref 3.5–5.1)
PROTHROMBIN TIME: 12.7 SECONDS (ref 11.7–14.5)
RBC # BLD: 3.24 M/CU MM (ref 4.2–5.4)
SEGMENTED NEUTROPHILS ABSOLUTE COUNT: 5.1 K/CU MM
SEGMENTED NEUTROPHILS RELATIVE PERCENT: 81.1 % (ref 36–66)
SODIUM BLD-SCNC: 134 MMOL/L (ref 135–145)
TOTAL IMMATURE NEUTOROPHIL: 0.04 K/CU MM
TRIGL SERPL-MCNC: 325 MG/DL
TSH HIGH SENSITIVITY: 1.51 UIU/ML (ref 0.27–4.2)
WBC # BLD: 6.3 K/CU MM (ref 4–10.5)

## 2021-08-27 PROCEDURE — 2580000003 HC RX 258: Performed by: PHYSICIAN ASSISTANT

## 2021-08-27 PROCEDURE — 80061 LIPID PANEL: CPT

## 2021-08-27 PROCEDURE — 85610 PROTHROMBIN TIME: CPT

## 2021-08-27 PROCEDURE — 96361 HYDRATE IV INFUSION ADD-ON: CPT

## 2021-08-27 PROCEDURE — 6370000000 HC RX 637 (ALT 250 FOR IP): Performed by: PHYSICIAN ASSISTANT

## 2021-08-27 PROCEDURE — 83036 HEMOGLOBIN GLYCOSYLATED A1C: CPT

## 2021-08-27 PROCEDURE — 36415 COLL VENOUS BLD VENIPUNCTURE: CPT

## 2021-08-27 PROCEDURE — 93005 ELECTROCARDIOGRAM TRACING: CPT | Performed by: PHYSICIAN ASSISTANT

## 2021-08-27 PROCEDURE — 83735 ASSAY OF MAGNESIUM: CPT

## 2021-08-27 PROCEDURE — 96366 THER/PROPH/DIAG IV INF ADDON: CPT

## 2021-08-27 PROCEDURE — 83721 ASSAY OF BLOOD LIPOPROTEIN: CPT

## 2021-08-27 PROCEDURE — 85025 COMPLETE CBC W/AUTO DIFF WBC: CPT

## 2021-08-27 PROCEDURE — 2500000003 HC RX 250 WO HCPCS: Performed by: PHYSICIAN ASSISTANT

## 2021-08-27 PROCEDURE — 82962 GLUCOSE BLOOD TEST: CPT

## 2021-08-27 PROCEDURE — 6370000000 HC RX 637 (ALT 250 FOR IP): Performed by: HOSPITALIST

## 2021-08-27 PROCEDURE — 1200000000 HC SEMI PRIVATE

## 2021-08-27 PROCEDURE — G0378 HOSPITAL OBSERVATION PER HR: HCPCS

## 2021-08-27 PROCEDURE — 93010 ELECTROCARDIOGRAM REPORT: CPT | Performed by: INTERNAL MEDICINE

## 2021-08-27 PROCEDURE — 94761 N-INVAS EAR/PLS OXIMETRY MLT: CPT

## 2021-08-27 PROCEDURE — 84443 ASSAY THYROID STIM HORMONE: CPT

## 2021-08-27 PROCEDURE — 80048 BASIC METABOLIC PNL TOTAL CA: CPT

## 2021-08-27 PROCEDURE — 96375 TX/PRO/DX INJ NEW DRUG ADDON: CPT

## 2021-08-27 PROCEDURE — 6360000002 HC RX W HCPCS: Performed by: PHYSICIAN ASSISTANT

## 2021-08-27 PROCEDURE — 6360000002 HC RX W HCPCS: Performed by: EMERGENCY MEDICINE

## 2021-08-27 RX ORDER — METRONIDAZOLE 500 MG/1
500 TABLET ORAL EVERY 8 HOURS SCHEDULED
Status: DISCONTINUED | OUTPATIENT
Start: 2021-08-27 | End: 2021-08-28 | Stop reason: HOSPADM

## 2021-08-27 RX ORDER — NICOTINE POLACRILEX 4 MG
15 LOZENGE BUCCAL PRN
Status: DISCONTINUED | OUTPATIENT
Start: 2021-08-27 | End: 2021-08-28 | Stop reason: HOSPADM

## 2021-08-27 RX ORDER — CIPROFLOXACIN 2 MG/ML
400 INJECTION, SOLUTION INTRAVENOUS EVERY 12 HOURS
Status: DISCONTINUED | OUTPATIENT
Start: 2021-08-27 | End: 2021-08-27

## 2021-08-27 RX ORDER — CIPROFLOXACIN 500 MG/1
500 TABLET, FILM COATED ORAL EVERY 12 HOURS SCHEDULED
Status: DISCONTINUED | OUTPATIENT
Start: 2021-08-28 | End: 2021-08-28 | Stop reason: HOSPADM

## 2021-08-27 RX ORDER — DEXTROSE MONOHYDRATE 50 MG/ML
100 INJECTION, SOLUTION INTRAVENOUS PRN
Status: DISCONTINUED | OUTPATIENT
Start: 2021-08-27 | End: 2021-08-28 | Stop reason: HOSPADM

## 2021-08-27 RX ORDER — SODIUM CHLORIDE 9 MG/ML
INJECTION, SOLUTION INTRAVENOUS CONTINUOUS
Status: DISCONTINUED | OUTPATIENT
Start: 2021-08-27 | End: 2021-08-28 | Stop reason: HOSPADM

## 2021-08-27 RX ORDER — DEXTROSE MONOHYDRATE 25 G/50ML
12.5 INJECTION, SOLUTION INTRAVENOUS PRN
Status: DISCONTINUED | OUTPATIENT
Start: 2021-08-27 | End: 2021-08-28 | Stop reason: HOSPADM

## 2021-08-27 RX ADMIN — BENZOCAINE 1 LOZENGE: 3.6; 15 LOZENGE ORAL at 00:36

## 2021-08-27 RX ADMIN — CIPROFLOXACIN 400 MG: 2 INJECTION, SOLUTION INTRAVENOUS at 10:41

## 2021-08-27 RX ADMIN — FAMOTIDINE 20 MG: 10 INJECTION INTRAVENOUS at 00:29

## 2021-08-27 RX ADMIN — RIVAROXABAN 20 MG: 20 TABLET, FILM COATED ORAL at 18:31

## 2021-08-27 RX ADMIN — SODIUM CHLORIDE 25 ML: 9 INJECTION, SOLUTION INTRAVENOUS at 15:22

## 2021-08-27 RX ADMIN — METRONIDAZOLE 500 MG: 500 TABLET ORAL at 21:42

## 2021-08-27 RX ADMIN — CIPROFLOXACIN 400 MG: 2 INJECTION, SOLUTION INTRAVENOUS at 20:38

## 2021-08-27 RX ADMIN — SODIUM CHLORIDE: 9 INJECTION, SOLUTION INTRAVENOUS at 15:10

## 2021-08-27 RX ADMIN — SODIUM CHLORIDE: 9 INJECTION, SOLUTION INTRAVENOUS at 00:16

## 2021-08-27 RX ADMIN — METRONIDAZOLE 500 MG: 500 INJECTION, SOLUTION INTRAVENOUS at 06:45

## 2021-08-27 RX ADMIN — SODIUM CHLORIDE: 9 INJECTION, SOLUTION INTRAVENOUS at 10:38

## 2021-08-27 RX ADMIN — METOCLOPRAMIDE HYDROCHLORIDE 10 MG: 5 INJECTION INTRAMUSCULAR; INTRAVENOUS at 00:29

## 2021-08-27 RX ADMIN — METRONIDAZOLE 500 MG: 500 INJECTION, SOLUTION INTRAVENOUS at 15:26

## 2021-08-27 RX ADMIN — SERTRALINE 50 MG: 50 TABLET, FILM COATED ORAL at 20:35

## 2021-08-27 RX ADMIN — INSULIN LISPRO 1 UNITS: 100 INJECTION, SOLUTION INTRAVENOUS; SUBCUTANEOUS at 00:34

## 2021-08-27 ASSESSMENT — PAIN SCALES - GENERAL: PAINLEVEL_OUTOF10: 0

## 2021-08-27 NOTE — CARE COORDINATION
CM met with the patient for discharge planning. Patient lives at home with her , has insurance with Rx coverage & PCP, stated that she is independent with ADL's, and no longer drives (family provides transportation as needed). Patient stated that she does not require the use of any assistive devices or home oxygen. Patient plans to return home upon discharge and is unable to identify any needs at this time. CM available if needs arise.

## 2021-08-27 NOTE — PROGRESS NOTES
Medication list verbally verified with the patient. Skin assessment performed by myself and Cade Mcwilliams RN, no abnormalities were noted.

## 2021-08-27 NOTE — PLAN OF CARE
Problem: Pain:  Goal: Pain level will decrease  Description: Pain level will decrease  Outcome: Ongoing  Goal: Control of acute pain  Description: Control of acute pain  Outcome: Ongoing  Goal: Control of chronic pain  Description: Control of chronic pain  Outcome: Ongoing     Problem: Fluid Volume:  Goal: Signs and symptoms of dehydration will decrease  Description: Signs and symptoms of dehydration will decrease  Outcome: Ongoing  Goal: Ability to achieve a balanced intake and output will improve  Description: Ability to achieve a balanced intake and output will improve  Outcome: Ongoing  Goal: Diagnostic test results will improve  Description: Diagnostic test results will improve  Outcome: Ongoing     Problem: Physical Regulation:  Goal: Complications related to the disease process, condition or treatment will be avoided or minimized  Description: Complications related to the disease process, condition or treatment will be avoided or minimized  Outcome: Ongoing  Goal: Ability to maintain vital signs within normal range will improve  Description: Ability to maintain vital signs within normal range will improve  Outcome: Ongoing     Problem: Discharge Planning:  Goal: Discharged to appropriate level of care  Description: Discharged to appropriate level of care  Outcome: Ongoing

## 2021-08-27 NOTE — ED PROVIDER NOTES
[place] [facility]      Pt Name: Contreras Garcias  MRN: [id number]  [place] [id number] evaluation: 8/26/2021  Provider:Kurt Islas MD    [address] [place] [address]       Chief Complaint   Patient presents with    Emesis     since monday     Fever         HISTORY OF PRESENT ILLNESS    Contreras Garcias is a 77 y.o. female who presents to the emergency department with nausea vomiting. Duration 4 days. Associated with fever. Not able to tolerate her food and fluid. Vomits every time she eats anything. Is having burning in her chest as a result of the vomiting. Nothing taken for nausea. Specifically is not on Zofran. She says that she is had similar episodes over the past several months but did not have vomiting on those occasions. Nursing Notes were reviewed. REVIEW OF SYSTEMS       Review of Systems    10 point review of systems was performed and was negative exceptas specifically noted in the HPI. PAST MEDICAL HISTORY     Past Medical History:   Diagnosis Date    Cataract     DM2 (diabetes mellitus, type 2) ([facility] [address] 2005    H/O Doppler ultrasound 2/22/16    Carotid-Normal. No evidence for hemodynamically significant flow disturbance.  H/O echocardiogram 2/22/16    Normal left ventricular systolic function. Mild concentric LVh EF 55 to 60%     H/O exercise stress test 3/18/16     Normal test. No ischemia or arrhythmia.     History of cardiac monitoring 4/21/16    Normal event monitor findings suggestive of SR with mild degree of sinus tachycardia on April 3.    HTN (hypertension)     Hx-TIA (transient ischemic attack) 3/11/2016    2/2016 [facility]. MRI negative    Hyperlipidemia     Major depression 2/9/2015    PAF (paroxysmal atrial fibrillation) ([facility] [address] 3/11/2016    Telemetry strips 2/23/16 [facility]         SURGICAL HISTORY       Past Surgical History:   Procedure Laterality Date   [facility] HYSTERECTOMY  2017         CURRENT MEDICATIONS       Previous Medications    ATORVASTATIN (LIPITOR) 40 MG TABLET    Take 1 tablet by mouth daily. HYDROCHLOROTHIAZIDE (HYDRODIURIL) 12.5 MG TABLET    Take 1 tablet by mouth daily    LISINOPRIL (PRINIVIL;ZESTRIL) 40 MG TABLET    Take 1 tablet by mouth daily. METFORMIN (GLUCOPHAGE) 850 MG TABLET    Take 1 tablet by mouth 2 times daily (with meals). RIVAROXABAN (XARELTO) 20 MG TABS TABLET    Take 20 mg by mouth    SERTRALINE (ZOLOFT) 50 MG TABLET    Take 50 mg by mouth daily       ALLERGIES     Cephalexin and Phenazopyridine    FAMILY HISTORY       Family History   Problem Relation Age of Onset    Diabetes Mother     Hypertension Father     High Cholesterol Father     Diabetes Father     Thyroid Disease Sister     Heart Disease Daughter          7 weeks old          SOCIAL HISTORY       Social History     Socioeconomic History    Marital status:      Spouse name: None    Number of children: None    Years of education: None    Highest education level: None   Occupational History    None   Tobacco Use    Smoking status: Never Smoker    Smokeless tobacco: Never Used   Vaping Use    Vaping Use: Never used   Substance and Sexual Activity    Alcohol use: Yes     Comment: occasional,wine    Drug use: No    Sexual activity: Yes     Partners: Male   Other Topics Concern    None   Social History Narrative    None     Social Determinants of Health     Financial Resource Strain:     Difficulty of Paying Living Expenses:    Food Insecurity:     Worried About Running Out of Food in the Last Year:     Ran Out of Food in the Last Year:    Transportation Needs:     Lack of Transportation (Medical):      Lack of Transportation (Non-Medical):    Physical Activity:     Days of Exercise per Week:     Minutes of Exercise per Session:    Stress:     Feeling of Stress :    Social Connections:     Frequency of Communication with Friends and Family: Lymphocytes % 4.2 (*)     Eosinophils % 3.2 (*)     Immature Neutrophil % 0.6 (*)     All other components within normal limits   COMPREHENSIVE METABOLIC PANEL W/ REFLEX TO MG FOR LOW K - Abnormal; Notable for the following components:    Sodium 132 (*)     Chloride 92 (*)     CO2 20 (*)     BUN 42 (*)     CREATININE 1.5 (*)     Glucose 171 (*)     GFR Non- 35 (*)     GFR  42 (*)     Anion Gap 20 (*)     All other components within normal limits   URINALYSIS WITH MICROSCOPIC - Abnormal; Notable for the following components:    Clarity, UA SLIGHTLY HAZY (*)     Ketones, Urine TRACE (*)     Protein, UA TRACE (*)     Leukocyte Esterase, Urine 2+ (*)     Bacteria, UA OCCASIONAL (*)     All other components within normal limits   LIPASE   LACTIC ACID, PLASMA       All other labs were within normal range or not returned as of this dictation. EMERGENCY DEPARTMENT COURSE and DIFFERENTIAL DIAGNOSIS/MDM:   Vitals:    Vitals:    08/26/21 1740   BP: 114/69   Pulse: 77   Resp: 18   Temp: 98.5 °F (36.9 °C)   TempSrc: Oral   SpO2: 98%   Weight: 170 lb (77.1 kg)   Height: 5' 1\" (1.549 m)       Medications   metronidazole (FLAGYL) 500 mg in NaCl 100 mL IVPB premix (500 mg IntraVENous New Bag 8/26/21 2312)   ciprofloxacin (CIPRO) IVPB 400 mg (400 mg IntraVENous New Bag 8/26/21 2310)   metoclopramide (REGLAN) injection 10 mg (has no administration in time range)   ondansetron (ZOFRAN) injection 4 mg (4 mg IntraVENous Given 8/26/21 2033)   0.9 % sodium chloride bolus (1,000 mLs IntraVENous New Bag 8/26/21 2031)       MDM     Patient presents with nausea and vomiting. Vital signs stable, no fever actively. Labs suggest a prerenal acute kidney injury likely from dehydration. Will administer Zofran and 1 L of normal saline get a CT of the abdomen and pelvis. We will not use IV contrast due to the LIA. CT shows acute uncomplicated diverticulitis.  Will admit to medicine due to inability to tolerate po, will give cipro/flagyl. REASSESSMENT          CRITICAL CARE TIME   Critical Care time was 0 minutes, excluding separately reportable procedures. There was a high probability of clinically significant/life threatening deteriorationin the patient's condition which required my urgent intervention. CONSULTS:  None     PROCEDURES:  Unless otherwise noted below, none     Procedures    FINAL IMPRESSION      1. Acute diverticulitis    2. Nausea and vomiting, intractability of vomiting not specified, unspecified vomiting type          DISPOSITION/PLAN   DISPOSITION Admitted 08/26/2021 10:49:04 PM      PATIENT REFERRED TO:  No follow-up provider specified.     DISCHARGE MEDICATIONS:  New Prescriptions    No medications on file          (Please note that portions of this note were completed with a voicerecognition program.  Efforts were made to edit the dictations but occasionally words are mis-transcribed.)    Franci James MD (electronically signed)  Attending Emergency Physician            Franci James MD  08/26/21 6619

## 2021-08-27 NOTE — PROGRESS NOTES
Visit with patient and family at bedside. Discussed current NPO, when diet is advanced to start off slow and follow low fiber diet until inflammation improves, then follow a high fiber diet with increased fluids. Try to avoid hard stools. Minimize seeds, nuts, and hulls. Will be available as needed.

## 2021-08-27 NOTE — H&P
HISTORY AND PHYSICAL             Name:  Lulu Lieberman /Age/Sex: 1955  (77 y.o. female)   MRN & CSN:  [id number] & [id number] Admission Date/Time: 2021  6:20 PM   Location:  008-01 PCP: JORGE LUIS Marcos CNP         Chief Complaint     Chief Complaint   Patient presents with    Emesis     since monday     Fever        History Obtained From     Patient    History of Present Illness (4 elements)   Lulu Lieberman is a 77 y.o. who presents to the hospital with complaint of nausea and vomiting. This patient states Monday she experienced chills. On Tuesday she had temp of 102 with associated chills. She began with nausea followed by emesis with decreased food and fluid intake. On Wednesday she continued with the same symptoms but not reporting fever at that time. She reports on Monday or Tuesday she had some left lower quadrant abdominal pain which is not present at this time as a complaint. Today Thursday she comes in at the request of her PCP due to the continuation of nausea, vomiting with decreased food and fluid intake. Mild abdominal pain reported. She does not indicate any diarrhea, constipation or urinary symptoms. [place] any chest pain or shortness of breath. She indicates no respiratory complaints. The patient has had Covid vaccine [place] on 2021 in 2021. Past medical history; includes diabetes type 2, HLD, HTN, depression, paroxysmal atrial fibrillation last noted , TIA and on Xarelto. Past surgical history: Hysterectomy , back surgery 1886 and  x138 years ago. Social history: The patient is a full code. She drinks wine on rare occasion. She does not smoke. Past Medical History     Past Medical History:   Diagnosis Date    Cataract     DM2 (diabetes mellitus, type 2) ([facility] [address]     H/O Doppler ultrasound 16    Carotid-Normal. No evidence for hemodynamically significant flow disturbance.      H/O echocardiogram Hypertension Father     High Cholesterol Father     Diabetes Father     Thyroid Disease Sister     Heart Disease Daughter          7 weeks old       Review of Systems (need 8 systems reviewed)   Review of Systems   Constitutional: Positive for appetite change, chills, fatigue and fever. Negative for activity change. HENT: Positive for sore throat. Negative for congestion. Sore throat secondary to the nausea and vomiting. Eyes: Negative. Respiratory: Negative for chest tightness and shortness of breath. Cardiovascular: Negative for chest pain and leg swelling. Gastrointestinal: Positive for abdominal pain, nausea and vomiting. Endocrine: Negative. Genitourinary: Negative for difficulty urinating, dysuria, flank pain, frequency and urgency. Musculoskeletal: Negative. Skin: Negative. Allergic/Immunologic: Negative. Neurological: Negative for dizziness, weakness and headaches. Psychiatric/Behavioral: Positive for sleep disturbance. Ten point ROS reviewed negative, unless as noted above  Physical Exam (need 10 systems)   /70   Pulse 78   Temp 98.1 °F (36.7 °C) (Oral)   Resp 16   Ht 5' 1\" (1.549 m)   Wt 166 lb (75.3 kg)   LMP 2015 (Approximate)   SpO2 98%   BMI 31.37 kg/m²   Physical Exam  Vitals and nursing note reviewed. Constitutional:       Appearance: Normal appearance. She is well-developed. She is obese. HENT:      Head: Normocephalic and atraumatic. Right Ear: External ear normal.      Left Ear: External ear normal.      Mouth/Throat:      Mouth: Mucous membranes are moist.      Pharynx: Oropharynx is clear. Eyes:      General: No scleral icterus. Right eye: No discharge. Left eye: No discharge. Conjunctiva/sclera: Conjunctivae normal.   Cardiovascular:      Rate and Rhythm: Normal rate and regular rhythm. Heart sounds: Normal heart sounds.    Pulmonary:      Effort: Pulmonary effort is normal.      Breath sounds: Normal breath sounds. Abdominal:      General: Abdomen is flat. Bowel sounds are normal.      Palpations: Abdomen is soft. There is no mass. Tenderness: There is abdominal tenderness. There is no right CVA tenderness, left CVA tenderness, guarding or rebound. Musculoskeletal:         General: Normal range of motion. Cervical back: Normal range of motion and neck supple. Right lower leg: No edema. Left lower leg: No edema. Skin:     General: Skin is warm and dry. Findings: No rash. Neurological:      General: No focal deficit present. Mental Status: She is alert and oriented to person, place, and time. Mental status is at baseline. Psychiatric:         Mood and Affect: Mood normal.         Behavior: Behavior normal.         Thought Content:  Thought content normal.         Judgment: Judgment normal.            Labs      Recent Results (from the past 24 hour(s))   CBC auto diff    Collection Time: 08/26/21  7:30 PM   Result Value Ref Range    WBC 8.8 4.0 - 10.5 K/CU MM    RBC 3.86 (L) 4.2 - 5.4 M/CU MM    Hemoglobin 10.8 (L) 12.5 - 16.0 GM/DL    Hematocrit 32.8 (L) 37 - 47 %    MCV 85.0 78 - 100 FL    MCH 28.0 27 - 31 PG    MCHC 32.9 32.0 - 36.0 %    RDW 14.7 11.7 - 14.9 %    Platelets 752 459 - 724 K/CU MM    MPV 10.4 7.5 - 11.1 FL    Differential Type AUTOMATED DIFFERENTIAL     Segs Relative 88.5 (H) 36 - 66 %    Lymphocytes % 4.2 (L) 24 - 44 %    Monocytes % 3.3 0 - 4 %    Eosinophils % 3.2 (H) 0 - 3 %    Basophils % 0.2 0 - 1 %    Segs Absolute 7.8 K/CU MM    Lymphocytes Absolute 0.4 K/CU MM    Monocytes Absolute 0.3 K/CU MM    Eosinophils Absolute 0.3 K/CU MM    Basophils Absolute 0.0 K/CU MM    Immature Neutrophil % 0.6 (H) 0 - 0.43 %    Total Immature Neutrophil 0.05 K/CU MM   Comprehensive Metabolic Panel w/ Reflex to MG    Collection Time: 08/26/21  7:30 PM   Result Value Ref Range    Sodium 132 (L) 135 - 145 MMOL/L    Potassium 3.7 3.5 - 5.1 MMOL/L    Chloride 92 (L) 99 - 110 mMol/L    CO2 20 (L) 21 - 32 MMOL/L    BUN 42 (H) 6 - 23 MG/DL    CREATININE 1.5 (H) 0.6 - 1.1 MG/DL    Glucose 171 (H) 70 - 99 MG/DL    Calcium 8.6 8.3 - 10.6 MG/DL    Albumin 4.0 3.4 - 5.0 GM/DL    Total Protein 6.6 6.4 - 8.2 GM/DL    Total Bilirubin 0.4 0.0 - 1.0 MG/DL    ALT 24 10 - 40 U/L    AST 24 15 - 37 IU/L    Alkaline Phosphatase 75 40 - 129 IU/L    GFR Non- 35 (L) >60 mL/min/1.73m2    GFR  42 (L) >60 mL/min/1.73m2    Anion Gap 20 (H) 4 - 16   Lipase    Collection Time: 08/26/21  7:30 PM   Result Value Ref Range    Lipase 19 13 - 60 IU/L   Urinalysis with microscopic    Collection Time: 08/26/21  7:30 PM   Result Value Ref Range    Color, UA YELLOW YELLOW    Clarity, UA SLIGHTLY HAZY (A) CLEAR    Glucose, Urine NEGATIVE NEGATIVE MG/DL    Bilirubin Urine NEGATIVE NEGATIVE MG/DL    Ketones, Urine TRACE (A) NEGATIVE MG/DL    Specific Gravity, UA 1.015 1.001 - 1.035    Blood, Urine NEGATIVE NEGATIVE    pH, Urine 5.0 5.0 - 8.0    Protein, UA TRACE (A) NEGATIVE MG/DL    Urobilinogen, Urine 0.2 0.2 - 1.0 MG/DL    Nitrite Urine, Quantitative NEGATIVE NEGATIVE    Leukocyte Esterase, Urine 2+ (A) NEGATIVE    Volume, (UVOL) 12 10 - 12 ML    RBC, UA NO CELLS SEEN 0 - 6 /HPF    WBC, UA 10 TO 25 0 - 5 /HPF    Epithelial Cells, UA 25 TO 50 /HPF    Cast Type NO CAST FORMS SEEN NO CAST FORMS SEEN /HPF    Bacteria, UA OCCASIONAL (A) NEGATIVE /HPF    Crystal Type NONE SEEN NEGATIVE /HPF    Mucus, UA NEGATIVE NEGATIVE HPF   Lactic Acid, Plasma    Collection Time: 08/26/21  8:30 PM   Result Value Ref Range    Lactate 1.8 0.4 - 2.0 mMOL/L   EKG 12 lead    Collection Time: 08/27/21 12:00 AM   Result Value Ref Range    Ventricular Rate 68 BPM    Atrial Rate 68 BPM    P-R Interval 164 ms    QRS Duration 84 ms    Q-T Interval 414 ms    QTc Calculation (Bazett) 440 ms    P Axis 37 degrees    R Axis -6 degrees    T Axis 18 degrees    Diagnosis       Normal sinus rhythm  Normal ECG  When compared with ECG of 14-OCT-2018 09:01,  No significant change was found          Imaging/Diagnostics Last 24 Hours   CT ABDOMEN PELVIS WO CONTRAST Additional Contrast? None    Result Date: 8/26/2021  EXAMINATION: CT OF THE ABDOMEN AND PELVIS WITHOUT CONTRAST 8/26/2021 9:13 pm TECHNIQUE: CT of the abdomen and pelvis was performed without the administration of intravenous contrast. Multiplanar reformatted images are provided for review. Dose modulation, iterative reconstruction, and/or weight based adjustment of the mA/kV was utilized to reduce the radiation dose to as low as reasonably achievable. COMPARISON: None. HISTORY: ORDERING SYSTEM PROVIDED HISTORY: vomiting, LIA, fever TECHNOLOGIST PROVIDED HISTORY: Reason for exam:->vomiting, LIA, fever Additional Contrast?->None Decision Support Exception - unselect if not a suspected or confirmed emergency medical condition->Emergency Medical Condition (MA) Reason for Exam: emesis, fever Acuity: Acute Type of Exam: Initial Additional signs and symptoms: emesis, fever Relevant Medical/Surgical History: emesis, fever FINDINGS: Lower Chest:  Visualized portion of the lower chest demonstrates no acute abnormality. Organs: Limited evaluation due lack of intravenous contrast.  Focal fatty hepatic infiltration along the falciform ligament and gallbladder fossa. Scattered calcified hepatic granulomata. Gallbladder, biliary system, pancreas, adrenal glands, and kidneys unremarkable. No hydronephrosis or urinary tract calculus. Normal spleen size with scattered splenic calcified granulomata. GI/Bowel: Small hiatal hernia. Diffuse colonic diverticulosis with inflammatory stranding about a diverticulum involving the descending colon left lower quadrant. No bowel obstruction. Normal appendix. Pelvis: Urinary bladder unremarkable. Prior hysterectomy. No pelvic mass.  Peritoneum/Retroperitoneum: Trace free fluid in the left pericolic gutter inferiorly adjacent to an inflamed diverticulum. No focal fluid collection or free air. Normal abdominal aortic caliber with mild scattered calcifications. Bones/Soft Tissues: No acute osseous or soft tissue abnormality. Tiny uncomplicated fat containing umbilical and left inguinal hernias. Severe L5-S1 degenerative disc disease and moderate lower lumbar facet arthropathy. Acute uncomplicated colonic diverticulitis in the left lower quadrant. Assessment      Hospital Problems         Last Modified POA    Acute diverticulitis 8/26/2021 Yes            Plan    1. Acute descending diverticulitis-seen on CT scan, treat with hydration, Cipro and Flagyl. Dilaudid 0.5 mg IV every 4 as needed moderate pain. 2. Intractable nausea and vomiting-treat with Zofran and other antiemetics as indicated along with hydration. 3. LIA-likely related to the above and will treat with hydration and monitor labs. 4. Diabetes type 2-hold Metformin until GI system and renal has improved. Will initiate low-dose insulin sliding scale and hydration. 5. Sore throat-likely related to the nausea and vomiting. Will initiate Pepcid. Cepacol throat lozenge. 6. Anticoagulation-patient on Xarelto 20. We will continue this as anticoagulant. Diet Diet NPO   DVT Prophylaxis [] Lovenox, []  Heparin, [] SCDs, [] Ambulation   GI Prophylaxis [] PPI,  [x] H2 Blocker,  [] Carafate,  [] Diet/Tube Feeds   Code Status Full Code   Surrogate Decision Maker  patient   Disposition Patient requires continued admission due to intractable nausea and vomiting secondary to acute descending colon diverticulitis.          Consultations Ordered:  IP CONSULT TO GI    Medications   Medications:    insulin lispro  0-6 Units Subcutaneous TID WC    insulin lispro  0-3 Units Subcutaneous Nightly    [Held by provider] atorvastatin  40 mg Oral Daily    [Held by provider] hydroCHLOROthiazide  12.5 mg Oral Daily    [Held by provider] lisinopril  40 mg Oral Daily    [Held by provider] metFORMIN  850 mg Oral BID WC    rivaroxaban  20 mg Oral Daily    sertraline  50 mg Oral Daily    metoclopramide  10 mg IntraVENous Once    sodium chloride flush  5-40 mL IntraVENous BID    famotidine (PEPCID) injection  20 mg IntraVENous Once      Infusions:    sodium chloride      dextrose      sodium chloride       PRN Meds: glucose, 15 g, PRN  dextrose, 12.5 g, PRN  glucagon (rDNA), 1 mg, PRN  dextrose, 100 mL/hr, PRN  sodium chloride flush, 5-40 mL, PRN  sodium chloride, 25 mL, PRN  ondansetron, 4 mg, Q8H PRN   Or  ondansetron, 4 mg, Q6H PRN  polyethylene glycol, 17 g, Daily PRN  acetaminophen, 650 mg, Q6H PRN   Or  acetaminophen, 650 mg, Q6H PRN  benzocaine-menthol, 1 lozenge, Q3H PRN            Electronically signed by Marielena Malone PA-C on 8/26/21 at

## 2021-08-27 NOTE — ED NOTES
Telephone report given to Tarun Urbina RN [facility] inpatient     Dave Cuevas RN  08/26/21 [id number]

## 2021-08-28 VITALS
TEMPERATURE: 97.1 F | HEART RATE: 61 BPM | DIASTOLIC BLOOD PRESSURE: 66 MMHG | RESPIRATION RATE: 16 BRPM | OXYGEN SATURATION: 97 % | WEIGHT: 166.2 LBS | SYSTOLIC BLOOD PRESSURE: 136 MMHG | BODY MASS INDEX: 31.38 KG/M2 | HEIGHT: 61 IN

## 2021-08-28 LAB — GLUCOSE BLD-MCNC: 104 MG/DL (ref 70–99)

## 2021-08-28 PROCEDURE — G0378 HOSPITAL OBSERVATION PER HR: HCPCS

## 2021-08-28 PROCEDURE — 6370000000 HC RX 637 (ALT 250 FOR IP): Performed by: HOSPITALIST

## 2021-08-28 PROCEDURE — 82962 GLUCOSE BLOOD TEST: CPT

## 2021-08-28 RX ORDER — POLYETHYLENE GLYCOL 3350 17 G/17G
17 POWDER, FOR SOLUTION ORAL DAILY PRN
Qty: 527 G | Refills: 1 | Status: SHIPPED | OUTPATIENT
Start: 2021-08-28 | End: 2021-10-29

## 2021-08-28 RX ORDER — WHEAT DEXTRIN 3 G/3.8 G
4 POWDER (GRAM) ORAL
Qty: 350 G | Refills: 1 | Status: SHIPPED | OUTPATIENT
Start: 2021-08-28

## 2021-08-28 RX ORDER — CIPROFLOXACIN 500 MG/1
500 TABLET, FILM COATED ORAL EVERY 12 HOURS SCHEDULED
Qty: 16 TABLET | Refills: 0 | Status: SHIPPED | OUTPATIENT
Start: 2021-08-28 | End: 2021-09-05

## 2021-08-28 RX ORDER — METRONIDAZOLE 500 MG/1
500 TABLET ORAL EVERY 8 HOURS SCHEDULED
Qty: 24 TABLET | Refills: 0 | Status: SHIPPED | OUTPATIENT
Start: 2021-08-28 | End: 2021-09-05

## 2021-08-28 RX ADMIN — CIPROFLOXACIN 500 MG: 500 TABLET, FILM COATED ORAL at 09:17

## 2021-08-28 RX ADMIN — METRONIDAZOLE 500 MG: 500 TABLET ORAL at 05:31

## 2021-08-28 RX ADMIN — METRONIDAZOLE 500 MG: 500 TABLET ORAL at 13:55

## 2021-08-28 NOTE — PROGRESS NOTES
Hospitalist Progress Note      Name:  Burnie Lefort /Age/Sex: 1955  (77 y.o. female)   MRN & CSN:  [id number] & [id number] Admission Date/Time: 2021  6:20 PM   Location:  008 PCP: Melissa Kaplan 112 Day: 2    Assessment and Plan:   Burnie Lefort is a 77 y.o.  female  who presents with     1. Acute descending diverticulitis-seen on CT scan, treat with hydration, Cipro and Flagyl. Dilaudid 0.5 mg IV every 4 as needed moderate pain. --Diet advanced, tolerating somewhat. Nausea/vomiting improving   --Will transition patient to oral antibiotics, if tolerating tomorrow, likely discharge  --Will need outpatient colonoscopy after 4-6 weeks   2. Intractable nausea and vomiting-treat with Zofran and other antiemetics as indicated along with hydration. 3. LIA-likely related to the above and will treat with hydration and monitor labs. 4. Diabetes type 2-hold Metformin until GI system and renal has improved. Will initiate low-dose insulin sliding scale and hydration. 5. Sore throat-likely related to the nausea and vomiting. Will initiate Pepcid. Cepacol throat lozenge. 6. Anticoagulation-patient on Xarelto 20. We will continue this as anticoagulant.     Diet Diet NPO   DVT Prophylaxis []? Lovenox, []? Heparin, []? SCDs, []? Ambulation   GI Prophylaxis []? PPI,  [x]? H2 Blocker,  []? Carafate,  []? Diet/Tube Feeds   Code Status Full Code   Surrogate Decision Maker  patient   Disposition Patient requires continued admission due to intractable nausea and vomiting secondary to acute descending colon diverticulitis.        History of Present Illness:     Chief Complaint: <principal problem not specified>  Burnie Lefort is a 77 y.o.  female  who presents with above    Improving, still somewhat nauseated     Ten point ROS reviewed negative, unless as noted above    Objective:        Intake/Output Summary (Last 24 hours) at 2021 2100  Last data filed at 2021 1853  Gross per 24 hour   Intake 1347.16 ml   Output --   Net 1347.16 ml      Vitals:   Vitals:    08/27/21 2030   BP: 115/78   Pulse: 66   Resp: 16   Temp: 97.5 °F (36.4 °C)   SpO2: 97%     Physical Exam:   GEN Awake female, sitting upright in bed in no apparent distress. Appears given age. EYES Pupils are equally round. No scleral erythema, discharge, or conjunctivitis. HENT Mucous membranes are moist. Oral pharynx without exudates, no evidence of thrush. NECK Supple, no apparent thyromegaly or masses. RESP Clear to auscultation, no wheezes, rales or rhonchi. Symmetric chest movement while on room air. CARDIO/VASC S1/S2 auscultated. Regular rate without appreciable murmurs, rubs, or gallops. No JVD or carotid bruits. Peripheral pulses equal bilaterally and palpable. No peripheral edema. GI Abdomen is soft without significant tenderness, masses, or guarding. Bowel sounds are normoactive. Rectal exam deferred.  No costovertebral angle tenderness. Normal appearing external genitalia. Zhao catheter is not present. HEME/LYMPH No palpable cervical lymphadenopathy and no hepatosplenomegaly. No petechiae or ecchymoses. MSK No gross joint deformities. SKIN Normal coloration, warm, dry. NEURO Cranial nerves appear grossly intact, normal speech, no lateralizing weakness. PSYCH Awake, alert, oriented x 4. Affect appropriate.     Medications:   Medications:    insulin lispro  0-6 Units Subcutaneous TID WC    insulin lispro  0-3 Units Subcutaneous Nightly    metroNIDAZOLE  500 mg IntraVENous Q8H    ciprofloxacin  400 mg IntraVENous Q12H    [Held by provider] atorvastatin  40 mg Oral Daily    [Held by provider] hydroCHLOROthiazide  12.5 mg Oral Daily    [Held by provider] lisinopril  40 mg Oral Daily    [Held by provider] metFORMIN  850 mg Oral BID WC    rivaroxaban  20 mg Oral Daily    sertraline  50 mg Oral Daily    sodium chloride flush  5-40 mL IntraVENous BID      Infusions:    sodium chloride 75 mL/hr at 08/27/21 1515    dextrose      sodium chloride 150 mL/hr at 08/27/21 1523     PRN Meds: glucose, 15 g, PRN  dextrose, 12.5 g, PRN  glucagon (rDNA), 1 mg, PRN  dextrose, 100 mL/hr, PRN  sodium chloride flush, 5-40 mL, PRN  sodium chloride, 25 mL, PRN  ondansetron, 4 mg, Q8H PRN   Or  ondansetron, 4 mg, Q6H PRN  polyethylene glycol, 17 g, Daily PRN  acetaminophen, 650 mg, Q6H PRN   Or  acetaminophen, 650 mg, Q6H PRN  benzocaine-menthol, 1 lozenge, Q3H PRN          Electronically signed by Vandana Trivedi MD on 8/27/2021 at 9:00 PM

## 2021-08-28 NOTE — PLAN OF CARE
Problem: Pain:  Goal: Pain level will decrease  Description: Pain level will decrease  8/28/2021 1343 by Rand Frannie  Outcome: Completed  8/28/2021 1250 by Rand Frannie  Outcome: Ongoing  Goal: Control of acute pain  Description: Control of acute pain  8/28/2021 1343 by Rand Frannie  Outcome: Completed  8/28/2021 1250 by Rand Frannie  Outcome: Ongoing  Goal: Control of chronic pain  Description: Control of chronic pain  8/28/2021 1343 by Rand Frannie  Outcome: Completed  8/28/2021 1250 by Rand Frannie  Outcome: Ongoing     Problem: Fluid Volume:  Goal: Signs and symptoms of dehydration will decrease  Description: Signs and symptoms of dehydration will decrease  8/28/2021 1343 by Rand Frannie  Outcome: Completed  8/28/2021 1250 by Rand Frannie  Outcome: Ongoing  Goal: Ability to achieve a balanced intake and output will improve  Description: Ability to achieve a balanced intake and output will improve  8/28/2021 1343 by Rand Frannie  Outcome: Completed  8/28/2021 1250 by Rand Frannie  Outcome: Ongoing  Goal: Diagnostic test results will improve  Description: Diagnostic test results will improve  8/28/2021 1343 by Rand Frannie  Outcome: Completed  8/28/2021 1250 by Rand Frannie  Outcome: Ongoing     Problem: Physical Regulation:  Goal: Complications related to the disease process, condition or treatment will be avoided or minimized  Description: Complications related to the disease process, condition or treatment will be avoided or minimized  8/28/2021 1343 by Rand Frannie  Outcome: Completed  8/28/2021 1250 by Rand Frannie  Outcome: Ongoing  Goal: Ability to maintain vital signs within normal range will improve  Description: Ability to maintain vital signs within normal range will improve  8/28/2021 1343 by Rand Frannie  Outcome: Completed  8/28/2021 1250 by Rand Frannie  Outcome: Ongoing     Problem: Discharge Planning:  Goal: Discharged to appropriate level of care  Description: Discharged to appropriate level of care  8/28/2021 1343 by Marcelina Acevedo  Outcome: Completed  8/28/2021 1250 by Marcelina Acevedo  Outcome: Ongoing

## 2021-09-05 NOTE — DISCHARGE SUMMARY
Discharge Summary    Name:  Buffy Mckeon /Age/Sex: 1955  (76 y.o. female)   MRN & CSN:  [id number] & [id number] Admission Date/Time: 2021  6:20 PM   Attending:  No att. providers found Discharging Physician: Lillie Danielle MD     Hospital Course:   Buffy Mckeon is a 77 y.o.  female  who presents with     1. Acute descending diverticulitis-seen on CT scan, treat with hydration, Cipro and Flagyl.  Dilaudid 0.5 mg IV every 4 as needed moderate pain. --Diet advanced, tolerating somewhat. Nausea/vomiting improving   --Will transition patient to oral antibiotics, if tolerating tomorrow, likely discharge  --Will need outpatient colonoscopy after 4-6 weeks   2. Intractable nausea and vomiting-treat with Zofran and other antiemetics as indicated along with hydration. 3. LIA-likely related to the above and will treat with hydration and monitor labs. 4. Diabetes type 2-hold Metformin until GI system and renal has improved.  Will initiate low-dose insulin sliding scale and hydration. 5. Sore throat-likely related to the nausea and vomiting.  Will initiate Pepcid.  Cepacol throat lozenge. 6. Anticoagulation-patient on Xarelto 21.  We will continue this as anticoagulant.     The patient expressed appropriate understanding of and agreement with the discharge recommendations, medications, and plan. Discharged in stable condition on PO abx and instructions on high fiber diet.     Consults this admission:  None    Discharge Instruction:   Follow up appointments: GI for outpatient c-scope in 4-6 weeks  Primary care physician:  within 2 weeks    Diet:  regular diet   Activity: activity as tolerated  Disposition: Discharged to:   [x]Home, []C, []SNF, []Acute Rehab, []Hospice   Condition on discharge: Stable    Discharge Medications:      Chaparro Mack   Home Medication Instructions T:[id number]    Printed on:21   Medication Information                      atorvastatin (LIPITOR) 40 MG tablet  Take 1 tablet by mouth daily. ciprofloxacin (CIPRO) 500 MG tablet  Take 1 tablet by mouth every 12 hours for 8 days             hydrochlorothiazide (HYDRODIURIL) 12.5 MG tablet  Take 1 tablet by mouth daily             lisinopril (PRINIVIL;ZESTRIL) 40 MG tablet  Take 1 tablet by mouth daily. metFORMIN (GLUCOPHAGE) 850 MG tablet  Take 1 tablet by mouth 2 times daily (with meals). metroNIDAZOLE (FLAGYL) 500 MG tablet  Take 1 tablet by mouth every 8 hours for 8 days             polyethylene glycol (GLYCOLAX) 17 g packet  Take 17 g by mouth daily as needed for Constipation             rivaroxaban (XARELTO) 20 MG TABS tablet  Take 20 mg by mouth             sertraline (ZOLOFT) 50 MG tablet  Take 50 mg by mouth daily             Wheat Dextrin (BENEFIBER) POWD  Take 4 g by mouth 3 times daily (with meals)                 Objective Findings at Discharge:   /66   Pulse 61   Temp 97.1 °F (36.2 °C)   Resp 16   Ht 5' 1\" (1.549 m)   Wt 166 lb 3.2 oz (75.4 kg)   LMP 01/14/2015 (Approximate)   SpO2 97%   BMI 31.40 kg/m²            PHYSICAL EXAM   GEN Awake female, sitting upright in bed in no apparent distress. Appears given age. EYES Pupils are equally round. No scleral erythema, discharge, or conjunctivitis. HENT Mucous membranes are moist. Oral pharynx without exudates, no evidence of thrush. NECK Supple, no apparent thyromegaly or masses. RESP Clear to auscultation, no wheezes, rales or rhonchi. Symmetric chest movement while on room air. CARDIO/VASC S1/S2 auscultated. Regular rate without appreciable murmurs, rubs, or gallops. No JVD or carotid bruits. Peripheral pulses equal bilaterally and palpable. No peripheral edema. GI Abdomen is soft without significant tenderness, masses, or guarding. Bowel sounds are normoactive. Rectal exam deferred.  No costovertebral angle tenderness. Normal appearing external genitalia.  Zhao catheter is not present. HEME/LYMPH No palpable cervical lymphadenopathy and no hepatosplenomegaly. No petechiae or ecchymoses. MSK No gross joint deformities. SKIN Normal coloration, warm, dry. NEURO Cranial nerves appear grossly intact, normal speech, no lateralizing weakness. PSYCH Awake, alert, oriented x 4. Affect appropriate. BMP/CBC  No results for input(s): NA, K, CL, CO2, BUN, CREATININE, WBC, HEMOGLOBIN, HCT, PLT in the last 72 hours.     Invalid input(s): GLU    Discharge Time of 35 minutes    Electronically signed by Corinne Requena, MD on 9/5/2021 at 7:54 PM

## 2021-09-20 ENCOUNTER — OFFICE VISIT (OUTPATIENT)
Dept: GASTROENTEROLOGY | Age: 66
End: 2021-09-20
Payer: MEDICARE

## 2021-09-20 VITALS
SYSTOLIC BLOOD PRESSURE: 124 MMHG | TEMPERATURE: 97.7 F | HEIGHT: 61 IN | OXYGEN SATURATION: 98 % | DIASTOLIC BLOOD PRESSURE: 78 MMHG | HEART RATE: 74 BPM | BODY MASS INDEX: 31.15 KG/M2 | WEIGHT: 165 LBS

## 2021-09-20 DIAGNOSIS — Z87.19 HISTORY OF DIVERTICULITIS: Primary | ICD-10-CM

## 2021-09-20 DIAGNOSIS — Z01.818 PREOP TESTING: ICD-10-CM

## 2021-09-20 DIAGNOSIS — R19.5 LOOSE STOOLS: ICD-10-CM

## 2021-09-20 PROCEDURE — 99204 OFFICE O/P NEW MOD 45 MIN: CPT | Performed by: NURSE PRACTITIONER

## 2021-09-20 RX ORDER — POLYETHYLENE GLYCOL 3350 17 G/17G
238 POWDER, FOR SOLUTION ORAL ONCE
Qty: 238 G | Refills: 0 | Status: SHIPPED | OUTPATIENT
Start: 2021-09-20 | End: 2021-09-20

## 2021-09-20 RX ORDER — ALUMINUM ZIRCONIUM OCTACHLOROHYDREX GLY 16 G/100G
1 GEL TOPICAL DAILY
Qty: 30 CAPSULE | Refills: 4 | Status: SHIPPED | OUTPATIENT
Start: 2021-09-20 | End: 2021-10-20

## 2021-09-20 RX ORDER — BISACODYL 5 MG
TABLET, DELAYED RELEASE (ENTERIC COATED) ORAL
Qty: 4 TABLET | Refills: 0 | Status: SHIPPED | OUTPATIENT
Start: 2021-09-20

## 2021-09-20 ASSESSMENT — ENCOUNTER SYMPTOMS
VOMITING: 0
COLOR CHANGE: 0
NAUSEA: 0
EYE PAIN: 0
COUGH: 0
BACK PAIN: 0
PHOTOPHOBIA: 0
BLOOD IN STOOL: 0
ABDOMINAL PAIN: 0
DIARRHEA: 0
CONSTIPATION: 0
SHORTNESS OF BREATH: 0

## 2021-09-20 NOTE — PROGRESS NOTES
Raul Portillo 77 y.o. female was seen by MARGY Albert on 09/20/21     Wt Readings from Last 3 Encounters:   09/20/21 165 lb (74.8 kg)   08/28/21 166 lb 3.2 oz (75.4 kg)   10/14/18 173 lb (78.5 kg)       ALICIA Portillo is a pleasant 77 y.o.  female who presents today with her daughter for acute diverticulitis and loose stools. She has a past medical history of cataract, DM2 (diabetes mellitus, type 2), h/o doppler ultrasound, h/o echocardiogram, h/o exercise stress test, history of cardiac monitoring, HTN (hypertension), hx-TIA (transient ischemic attack), hyperlipidemia, major depression, and PAF (paroxysmal atrial fibrillation). She is on Xarelto for history of atrial fibrillation. Her last colonoscopy was down ten years ago in [address] with per patient record normal results. Her first episode of diverticulitis was noted on CT of abdomen/pelvis done on 8-. Her CT of the abdomen/pelvis showed acute uncomplicated colonic diverticulitis in the left lower quadrant. She was treated with Cipro and Flagyl. In last six to eight months she has been having more loose stools. Her typical bowel pattern is daily to every other day with soft brown formed stools. She is taking Benefiber once a day. Loose stools occur a couple times a week. No fecal incontinence. No diarrhea or constipation. No blood in her stools or black tarry stools. No excess belching or flatulence. Her appetite is good without early satiety. Her weight is stable. No abdominal pain, bloating or distention. No nausea or vomiting. Intermittent heartburn with acid reflux. No nocturnal awakenings with acid reflux. No dysphagia or pain with swallowing. No family history of stomach or colon cancer. ROS  Review of Systems   Constitutional: Negative for chills, diaphoresis and fever. HENT: Negative for ear pain, hearing loss and tinnitus. Eyes: Positive for visual disturbance.  Negative for photophobia and pain. Respiratory: Negative for cough and shortness of breath. Cardiovascular: Negative for chest pain, palpitations and leg swelling. Gastrointestinal: Negative for abdominal pain, blood in stool, constipation, diarrhea, nausea and vomiting. Endocrine: Negative for cold intolerance, heat intolerance and polydipsia. Genitourinary: Negative for dysuria, frequency and urgency. Musculoskeletal: Negative for back pain, myalgias and neck pain. Skin: Negative for color change, pallor and rash. Allergic/Immunologic: Negative for environmental allergies and food allergies. Neurological: Negative for dizziness, seizures, weakness and headaches. Hematological: Bruises/bleeds easily. Psychiatric/Behavioral: Positive for dysphoric mood. Negative for sleep disturbance and suicidal ideas. The patient is not nervous/anxious. Allergies  Allergies   Allergen Reactions    Cephalexin Rash    Phenazopyridine Rash       Medications  Current Outpatient Medications   Medication Sig Dispense Refill    Wheat Dextrin (BENEFIBER) POWD Take 4 g by mouth 3 times daily (with meals) 350 g 1    hydrochlorothiazide (HYDRODIURIL) 12.5 MG tablet Take 1 tablet by mouth daily 30 tablet 6    sertraline (ZOLOFT) 50 MG tablet Take 50 mg by mouth daily      rivaroxaban (XARELTO) 20 MG TABS tablet Take 20 mg by mouth      lisinopril (PRINIVIL;ZESTRIL) 40 MG tablet Take 1 tablet by mouth daily. 30 tablet 2    metFORMIN (GLUCOPHAGE) 850 MG tablet Take 1 tablet by mouth 2 times daily (with meals). 60 tablet 2    atorvastatin (LIPITOR) 40 MG tablet Take 1 tablet by mouth daily. 30 tablet 2    polyethylene glycol (GLYCOLAX) 17 g packet Take 17 g by mouth daily as needed for Constipation (Patient not taking: Reported on 9/20/2021) 527 g 1     No current facility-administered medications for this visit.        Past medical history:   She has a past medical history of Cataract, DM2 (diabetes mellitus, type 2) ([facility] [address] H/O Doppler ultrasound, H/O echocardiogram, H/O exercise stress test, History of cardiac monitoring, HTN (hypertension), Hx-TIA (transient ischemic attack), Hyperlipidemia, Major depression, and PAF (paroxysmal atrial fibrillation) ([facility] [address] Past surgical history:  She has a past surgical history that includes back surgery ();  section; and Hysterectomy (2017). Social History:  She reports that she has never smoked. She has never used smokeless tobacco. She reports current alcohol use. She reports that she does not use drugs. Family history:  Her family history includes Diabetes in her father and mother; Heart Disease in her daughter; High Cholesterol in her father; Hypertension in her father; Thyroid Disease in her sister. Objective    Vitals:    21 1105   BP: 124/78   Pulse:    Temp:    SpO2:         Physical exam    Physical Exam  Vitals reviewed. Constitutional:       General: She is not in acute distress. Appearance: She is well-developed. She is obese. She is not ill-appearing, toxic-appearing or diaphoretic. HENT:      Head: Normocephalic and atraumatic. Nose: Nose normal.      Mouth/Throat:      Mouth: Mucous membranes are moist.   Eyes:      Conjunctiva/sclera: Conjunctivae normal.      Pupils: Pupils are equal, round, and reactive to light. Neck:      Thyroid: No thyromegaly. Vascular: No JVD. Trachea: No tracheal deviation. Cardiovascular:      Rate and Rhythm: Normal rate and regular rhythm. Pulses: Normal pulses. Heart sounds: Normal heart sounds. No murmur heard. No friction rub. No gallop. Pulmonary:      Effort: Pulmonary effort is normal. No respiratory distress. Breath sounds: Normal breath sounds. No stridor. No wheezing, rhonchi or rales. Chest:      Chest wall: No tenderness. Abdominal:      General: Bowel sounds are normal. There is no distension. Palpations: Abdomen is soft. There is no mass. Tenderness:  There is no abdominal tenderness. There is no guarding or rebound. Hernia: No hernia is present. Musculoskeletal:         General: Normal range of motion. Cervical back: Normal range of motion and neck supple. Lymphadenopathy:      Cervical: No cervical adenopathy. Skin:     General: Skin is warm and dry. Neurological:      Mental Status: She is alert and oriented to person, place, and time.    Psychiatric:         Mood and Affect: Mood normal.         Admission on 08/26/2021, Discharged on 08/28/2021   Component Date Value Ref Range Status    WBC 08/26/2021 8.8  4.0 - 10.5 K/CU MM Final    RBC 08/26/2021 3.86* 4.2 - 5.4 M/CU MM Final    Hemoglobin 08/26/2021 10.8* 12.5 - 16.0 GM/DL Final    Hematocrit 08/26/2021 32.8* 37 - 47 % Final    MCV 08/26/2021 85.0  78 - 100 FL Final    MCH 08/26/2021 28.0  27 - 31 PG Final    MCHC 08/26/2021 32.9  32.0 - 36.0 % Final    RDW 08/26/2021 14.7  11.7 - 14.9 % Final    Platelets 10/82/4128 211  140 - 440 K/CU MM Final    MPV 08/26/2021 10.4  7.5 - 11.1 FL Final    Differential Type 08/26/2021 AUTOMATED DIFFERENTIAL   Final    Segs Relative 08/26/2021 88.5* 36 - 66 % Final    Lymphocytes % 08/26/2021 4.2* 24 - 44 % Final    Monocytes % 08/26/2021 3.3  0 - 4 % Final    Eosinophils % 08/26/2021 3.2* 0 - 3 % Final    Basophils % 08/26/2021 0.2  0 - 1 % Final    Segs Absolute 08/26/2021 7.8  K/CU MM Final    Lymphocytes Absolute 08/26/2021 0.4  K/CU MM Final    Monocytes Absolute 08/26/2021 0.3  K/CU MM Final    Eosinophils Absolute 08/26/2021 0.3  K/CU MM Final    Basophils Absolute 08/26/2021 0.0  K/CU MM Final    Immature Neutrophil % 08/26/2021 0.6* 0 - 0.43 % Final    Total Immature Neutrophil 08/26/2021 0.05  K/CU MM Final    Sodium 08/26/2021 132* 135 - 145 MMOL/L Final    Potassium 08/26/2021 3.7  3.5 - 5.1 MMOL/L Final    Chloride 08/26/2021 92* 99 - 110 mMol/L Final    CO2 08/26/2021 20* 21 - 32 MMOL/L Final    BUN 08/26/2021 42* 6 - 23 MG/DL Final    CREATININE 08/26/2021 1.5* 0.6 - 1.1 MG/DL Final    Glucose 08/26/2021 171* 70 - 99 MG/DL Final    Calcium 08/26/2021 8.6  8.3 - 10.6 MG/DL Final    Albumin 08/26/2021 4.0  3.4 - 5.0 GM/DL Final    Total Protein 08/26/2021 6.6  6.4 - 8.2 GM/DL Final    Total Bilirubin 08/26/2021 0.4  0.0 - 1.0 MG/DL Final    ALT 08/26/2021 24  10 - 40 U/L Final    AST 08/26/2021 24  15 - 37 IU/L Final    Alkaline Phosphatase 08/26/2021 75  40 - 129 IU/L Final    GFR Non- 08/26/2021 35* >60 mL/min/1.73m2 Final    GFR  08/26/2021 42* >60 mL/min/1.73m2 Final    Anion Gap 08/26/2021 20* 4 - 16 Final    Lipase 08/26/2021 19  13 - 60 IU/L Final    Color, UA 08/26/2021 YELLOW  YELLOW Final    Clarity, UA 08/26/2021 SLIGHTLY HAZY* CLEAR Final    Glucose, Urine 08/26/2021 NEGATIVE  NEGATIVE MG/DL Final    Bilirubin Urine 08/26/2021 NEGATIVE  NEGATIVE MG/DL Final    Ketones, Urine 08/26/2021 TRACE* NEGATIVE MG/DL Final    Specific [place], UA 08/26/2021 1.015  1.001 - 1.035 Final    Blood, Urine 08/26/2021 NEGATIVE  NEGATIVE Final    pH, Urine 08/26/2021 5.0  5.0 - 8.0 Final    Protein, UA 08/26/2021 TRACE* NEGATIVE MG/DL Final    Urobilinogen, Urine 08/26/2021 0.2  0.2 - 1.0 MG/DL Final    Nitrite Urine, Quantitative 08/26/2021 NEGATIVE  NEGATIVE Final    Leukocyte Esterase, Urine 08/26/2021 2+* NEGATIVE Final    Volume, (UVOL) 08/26/2021 12  10 - 12 ML Final    RBC, UA 08/26/2021 NO CELLS SEEN  0 - 6 /HPF Final    WBC, UA 08/26/2021 10 TO 25  0 - 5 /HPF Final    Epithelial Cells, UA 08/26/2021 25 TO 50  /HPF Final    SQUAMOUS    Cast Type 08/26/2021 NO CAST FORMS SEEN  NO CAST FORMS SEEN /HPF Final    Bacteria, UA 08/26/2021 OCCASIONAL* NEGATIVE /HPF Final    Crystal Type 08/26/2021 NONE SEEN  NEGATIVE /HPF Final    Mucus, UA 08/26/2021 NEGATIVE  NEGATIVE HPF Final    Lactate 08/26/2021 1.8  0.4 - 2.0 mMOL/L Final    Sodium 08/27/2021 134* 135 - 145 MMOL/L Final    Potassium 08/27/2021 3.5  3.5 - 5.1 MMOL/L Final    Chloride 08/27/2021 97* 99 - 110 mMol/L Final    CO2 08/27/2021 23  21 - 32 MMOL/L Final    Anion Gap 08/27/2021 14  4 - 16 Final    BUN 08/27/2021 36* 6 - 23 MG/DL Final    CREATININE 08/27/2021 1.3* 0.6 - 1.1 MG/DL Final    Glucose 08/27/2021 119* 70 - 99 MG/DL Final    Calcium 08/27/2021 8.1* 8.3 - 10.6 MG/DL Final    GFR Non- 08/27/2021 41* >60 mL/min/1.73m2 Final    GFR  08/27/2021 49* >60 mL/min/1.73m2 Final    WBC 08/27/2021 6.3  4.0 - 10.5 K/CU MM Final    RBC 08/27/2021 3.24* 4.2 - 5.4 M/CU MM Final    Hemoglobin 08/27/2021 9.0* 12.5 - 16.0 GM/DL Final    Hematocrit 08/27/2021 27.4* 37 - 47 % Final    MCV 08/27/2021 84.6  78 - 100 FL Final    MCH 08/27/2021 27.8  27 - 31 PG Final    MCHC 08/27/2021 32.8  32.0 - 36.0 % Final    RDW 08/27/2021 14.6  11.7 - 14.9 % Final    Platelets 40/09/4261 186  140 - 440 K/CU MM Final    MPV 08/27/2021 10.2  7.5 - 11.1 FL Final    Differential Type 08/27/2021 AUTOMATED DIFFERENTIAL   Final    Segs Relative 08/27/2021 81.1* 36 - 66 % Final    Lymphocytes % 08/27/2021 7.3* 24 - 44 % Final    Monocytes % 08/27/2021 5.7* 0 - 4 % Final    Eosinophils % 08/27/2021 5.1* 0 - 3 % Final    Basophils % 08/27/2021 0.2  0 - 1 % Final    Segs Absolute 08/27/2021 5.1  K/CU MM Final    Lymphocytes Absolute 08/27/2021 0.5  K/CU MM Final    Monocytes Absolute 08/27/2021 0.4  K/CU MM Final    Eosinophils Absolute 08/27/2021 0.3  K/CU MM Final    Basophils Absolute 08/27/2021 0.0  K/CU MM Final    Immature Neutrophil % 08/27/2021 0.6* 0 - 0.43 % Final    Total Immature Neutrophil 08/27/2021 0.04  K/CU MM Final    Protime 08/27/2021 12.7  11.7 - 14.5 SECONDS Final    Comment: Protime seconds can vary  due to reagent sensitivity. Please use INR to monitor  oral anticoagulants.       INR 08/27/2021 1.02  INDEX Final    Comment: THERAPEUTIC RANGE:  INDICATIONS: INR 2.0-3.0  Most (DVT, PE,  Atrial Fibillation, Bioprosthetic valve,   St Judes bicuspid aortic valve)  INDICATIONS: 2.5-3.5 Most mechanical valves  recurrent thrombosis.  Ventricular Rate 08/27/2021 68  BPM Final    Atrial Rate 08/27/2021 68  BPM Final    P-R Interval 08/27/2021 164  ms Final    QRS Duration 08/27/2021 84  ms Final    Q-T Interval 08/27/2021 414  ms Final    QTc Calculation (Bazett) 08/27/2021 440  ms Final    P Axis 08/27/2021 37  degrees Final    R Axis 08/27/2021 -6  degrees Final    T Axis 08/27/2021 18  degrees Final    Diagnosis 08/27/2021    Final                    Value:Normal sinus rhythm  Normal ECG  When compared with ECG of 14-OCT-2018 09:01,  No significant change was found  Confirmed by OkMACHO Vaughn ([id number]) on 8/27/2021 6:06:44 PM      Hemoglobin A1C 08/27/2021 6.0  4.2 - 6.3 % Final    eAG 08/27/2021 126  mg/dL Final    Triglycerides 08/27/2021 325* <150 MG/DL Final    Cholesterol 08/27/2021 217* <200 MG/DL Final    Comment: (NOTE)  Cardiovascular risk evaluation guidelines:  Low Risk        <200 mg/dL  Average Risk    200-240 mg/dL  High Risk       >240 mg/dL        HDL 08/27/2021 22* >40 MG/DL Final    LDL Direct 08/27/2021 124* <100 MG/DL Final    TSH, High Sensitivity 08/27/2021 1.510  0.270 - 4.20 uIu/ml Final    Comment:         Patients with high levels of Biotin intake (ie >5mg/day) may have falsely decreased TSHS   levels. Samples collected within 8 hours of Biotin intake may require additional information for   diagnosis.  POC Glucose 08/27/2021 164* 70 - 99 MG/DL Final    Magnesium 08/27/2021 1.8  1.8 - 2.4 mg/dl Final    POC Glucose 08/27/2021 120* 70 - 99 MG/DL Final    POC Glucose 08/27/2021 143* 70 - 99 MG/DL Final    POC Glucose 08/27/2021 166* 70 - 99 MG/DL Final    POC Glucose 08/27/2021 106* 70 - 99 MG/DL Final    POC Glucose 08/28/2021 104* 70 - 99 MG/DL Final       Assessment and Plan:  1.   Will plan for a colonoscopy with MAC

## 2021-09-20 NOTE — PATIENT INSTRUCTIONS
plan will include the things you like to eat. It will also make sure that you get 30 grams of fiber a day. Before you make changes to the way you eat, be sure to talk with your doctor or dietitian. Follow-up care is a key part of your treatment and safety. Be sure to make and go to all appointments, and call your doctor if you are having problems. It's also a good idea to know your test results and keep a list of the medicines you take. How can you care for yourself at home? · You can increase how much fiber you get if you eat more of certain foods. These foods include:  ? Whole-grain breads and cereals. ? Fruits, such as pears, apples, and peaches. Eat the skins, peels, and seeds, if you can.  ? Vegetables, such as broccoli, cabbage, spinach, carrots, asparagus, and squash. ? Starchy vegetables. These include potatoes with skins, kidney beans, and lima beans. · Take a fiber supplement every day if your doctor recommends it. Examples are Benefiber, Citrucel, FiberCon, and Metamucil. Ask your doctor how much to take. · Drink plenty of fluids. If you have kidney, heart, or liver disease and have to limit fluids, talk with your doctor before you increase the amount of fluids you drink. · Get some exercise every day. Exercise helps stool move through the colon. It also helps prevent constipation. · Keep a food diary. Try to notice and write down what foods cause gas, pain, or other symptoms. Then you can avoid these foods. Where can you learn more? Go to [email or web address]://[email or web address] [email or web address]. org and sign in to your [employer] account. Enter Y045 in the [facility] box to learn more about \"High-Fiber Diet: Care Instructions. \"     If you do not have an account, please click on the \"Sign Up Now\" link. Current as of: December 17, 2020               Content Version: 12.9  © 0184-4369 Healthwise, Incorporated. Care instructions adapted under license by [facility] ([facility]).  If you have questions about a medical condition or this instruction, always ask your healthcare professional. [address] any warranty or liability for your use of this information. Patient Education        Colonoscopy: Before Your Procedure  What is a colonoscopy? A colonoscopy is a test that lets a doctor look inside your colon. The doctor uses a thin, lighted tube called a colonoscope to look for problems. These include small growths called polyps, cancer, or bleeding. During the test, the doctor can take samples of tissue that can be checked for cancer or other problems. This is called a biopsy. The doctor can also take out polyps. Before the test, you will need to stop eating solid foods. You also will be given instructions on how to clean out your colon. This helps your doctor be able to see inside your colon during the test.  How do you prepare for the procedure? Procedures can be stressful. This information will help you understand what you can expect. And it will help you safely prepare for your procedure. Preparing for the procedure    · Be sure you have someone to take you home. Anesthesia and pain medicine will make it unsafe for you to drive or get home on your own. · Understand exactly what procedure is planned, along with the risks, benefits, and other options.     · Tell your doctor ALL the medicines, vitamins, supplements, and herbal remedies you take. Some may increase the risk of problems during your procedure. Your doctor will tell you if you should stop taking any of them before the procedure and how soon to do it.     · If you take aspirin or some other blood thinner, ask your doctor if you should stop taking it before your procedure. Make sure that you understand exactly what your doctor wants you to do. These medicines increase the risk of bleeding.     · Make sure your doctor and the hospital have a copy of your advance directive.  If you don't have one, you may want to prepare one. It lets others know your health care wishes. It's a good thing to have before any type of surgery or procedure. Before the procedure    · Follow your doctor's directions about when to stop eating solid foods and drink only clear liquids. You can drink water, clear juices, clear broths, flavored ice pops, and gelatin (such as Jell-O). Do not eat or drink anything red or purple. This includes grape juice and grape-flavored ice pops. It also includes fruit punch and cherry gelatin.     · Drink the \"colon prep\" liquid as your doctor tells you. You will want to stay home, because the liquid will make you go to the bathroom a lot. Your stools will be loose and watery. It's very important to drink all of the liquid. If you have problems drinking it, call your doctor.     · Do not eat any solid foods after you drink the colon prep.     · Stop drinking clear liquids for a few hours before the test. Your doctor will tell you how many hours this will be. What happens on the day of the procedure? · Follow the instructions exactly about when to stop eating and drinking. If you don't, your procedure may be canceled. If your doctor told you to take your medicines on the day of the procedure, take them with only a sip of water.     · Take a bath or shower before you come in for your procedure. Do not apply lotions, perfumes, deodorants, or nail polish.     · Take off all jewelry and piercings. And take out contact lenses, if you wear them. At the doctor's office or hospital   · Bring a picture ID.     · You will be kept comfortable and safe by your anesthesia provider. The anesthesia may make you sleep.     · You will lie on your back or your side with your knees drawn up toward your belly. The doctor will gently put a gloved finger into your anus. Then the doctor puts the scope in and moves it into your colon.  The scope goes in easily because it is lubricated.     · The doctor may also use small tools to take tissue samples for a biopsy or to remove polyps. This does not hurt.     · The test usually takes 30 to 45 minutes. But it may take longer. It depends on what is found and what is done. When should you call your doctor? · You have questions or concerns.     · You don't understand how to prepare for your procedure.     · You are having trouble with the bowel prep.     · You become ill before the procedure (such as fever, flu, or a cold).     · You need to reschedule or have changed your mind about having the procedure. Where can you learn more? Go to https://[email or web address].Travolver. org and sign in to your [employer] account. Enter C315 in the [employer] box to learn more about \"Colonoscopy: Before Your Procedure. \"     If you do not have an account, please click on the \"Sign Up Now\" link. Current as of: December 17, 2020               Content Version: 12.9  © 2006-2021 [employer]. Care instructions adapted under license by [facility] ([facility]). If you have questions about a medical condition or this instruction, always ask your healthcare professional. [place] [address] any warranty or liability for your use of this information.

## 2021-10-20 ENCOUNTER — TELEPHONE (OUTPATIENT)
Dept: GASTROENTEROLOGY | Age: 66
End: 2021-10-20

## 2022-03-10 ENCOUNTER — TELEPHONE (OUTPATIENT)
Dept: GASTROENTEROLOGY | Age: 67
End: 2022-03-10

## 2022-04-07 NOTE — PROGRESS NOTES
Patient will arrive at 0700 at [facility] on 4/18/2022 for her procedure at 0900. 1. Do not eat or drink anything after midnight - unless instructed by your doctor prior to surgery. This includes                   no water, chewing gum or mints. 2. Follow your directions as prescribed by the doctor for your procedure and medications. 3. Check with your Doctor regarding stopping vitamins, supplements, blood thinners (Plavix, Coumadin, Lovenox, Effient, Pradaxa, Xarelto, Fragmin or                   other blood thinners) and follow their instructions. 4. Do not smoke, and do not drink any alcoholic beverages 24 hours prior to surgery. This includes NA Beer. 5. You may brush your teeth and gargle the morning of surgery. DO NOT SWALLOW WATER   6. You MUST make arrangements for a responsible adult to take you home after your surgery and be able to check on you every couple                   hours for the day. You will not be allowed to leave alone or drive yourself home. It is strongly suggested someone stay with you the first 24                   hrs. Your surgery will be cancelled if you do not have a ride home. 7. Please wear simple, loose fitting clothing to the hospital.  [place] not bring valuables (money, credit cards, checkbooks, etc.) Do not wear any                   makeup (including no eye makeup) or nail polish on your fingers or toes. 8. DO NOT wear any jewelry or piercings on day of surgery. All body piercing jewelry must be removed. 9. If you have dentures, they will be removed before going to the OR; we will provide you a container. If you wear contact lenses or glasses,                  they will be removed; please bring a case for them. 10. If you  have a Living Will and Durable Power of  for Healthcare, please bring in a copy.            11. Please bring picture ID,  insurance card, paperwork from the doctors office    (H & P, Consent, & card for implantable devices). 12. Take a shower the morning of your procedure with Hibiclens or an anti-bacterial soap. Do not apply any deodorant, lotion, oil or powder. Patient will take her metformin and zoloft the morning of her procedure.

## 2022-04-15 ENCOUNTER — ANESTHESIA EVENT (OUTPATIENT)
Dept: ENDOSCOPY | Age: 67
End: 2022-04-15
Payer: MEDICARE

## 2022-04-15 NOTE — ANESTHESIA PRE PROCEDURE
Department of Anesthesiology  Preprocedure Note       Name:  Jw Horowitz   Age:  74 y.o.  :  1955                                          MRN:  [id number]         Date:  4/15/2022      Surgeon: Mario Hart):  Ana Vigil MD    Procedure: Procedure(s):  COLONOSCOPY DIAGNOSTIC    Medications prior to admission:   Prior to Admission medications    Medication Sig Start Date End Date Taking? Authorizing Provider   bisacodyl (BISACODYL) 5 MG EC tablet Take 4 tablets once for colonoscopy prep 21   Fredis Henry, APRN - CNP   Wheat Dextrin (BENEFIBER) POWD Take 4 g by mouth 3 times daily (with meals) 21   Heidi Oliva MD   hydrochlorothiazide (HYDRODIURIL) 12.5 MG tablet Take 1 tablet by mouth daily 10/17/16   Germain Cuevas MD   sertraline (ZOLOFT) 50 MG tablet Take 50 mg by mouth daily    Historical Provider, MD   rivaroxaban (XARELTO) 20 MG TABS tablet Take 20 mg by mouth    Historical Provider, MD   lisinopril (PRINIVIL;ZESTRIL) 40 MG tablet Take 1 tablet by mouth daily. 2/9/15   Gus Clarke MD   metFORMIN (GLUCOPHAGE) 850 MG tablet Take 1 tablet by mouth 2 times daily (with meals). 2/9/15   Gus Clarke MD   atorvastatin (LIPITOR) 40 MG tablet Take 1 tablet by mouth daily. 2/9/15   Gus Clarke MD       Current medications:    No current facility-administered medications for this encounter. Current Outpatient Medications   Medication Sig Dispense Refill    bisacodyl (BISACODYL) 5 MG EC tablet Take 4 tablets once for colonoscopy prep 4 tablet 0    Wheat Dextrin (BENEFIBER) POWD Take 4 g by mouth 3 times daily (with meals) 350 g 1    hydrochlorothiazide (HYDRODIURIL) 12.5 MG tablet Take 1 tablet by mouth daily 30 tablet 6    sertraline (ZOLOFT) 50 MG tablet Take 50 mg by mouth daily      rivaroxaban (XARELTO) 20 MG TABS tablet Take 20 mg by mouth      lisinopril (PRINIVIL;ZESTRIL) 40 MG tablet Take 1 tablet by mouth daily.  30 tablet 2    metFORMIN (GLUCOPHAGE) 850 MG tablet Take 1 tablet by mouth 2 times daily (with meals). 60 tablet 2    atorvastatin (LIPITOR) 40 MG tablet Take 1 tablet by mouth daily. 30 tablet 2       Allergies: Allergies   Allergen Reactions    Cephalexin Rash    Phenazopyridine Rash       Problem List:    Patient Active Problem List   Diagnosis Code    DM2 (diabetes mellitus, type 2) ([facility] E11.9    Hyperlipidemia E78.5    HTN (hypertension) I10    Major depression F32.9    PAF [facility]oxysmal [facility] fibrillation) ([facility]) I48.0    Hx-TIA (transient ischemic attack) Z86.73    CHURCH (dyspnea on exertion) R06.00    Chronic fatigue R53.82    Acetylcholine overdose T44.1X1A    DM2 (diabetes mellitus, type 2) ([facility]) E11.9    Acute diverticulitis K57.92       Past Medical History:        Diagnosis Date    Arthritis     left ankle    Cataract     DM2 (diabetes mellitus, type 2) ([facility]     H/O Doppler ultrasound 16    Carotid-Normal. No evidence for hemodynamically significant flow disturbance.  H/O echocardiogram 16    Normal left ventricular systolic function. Mild concentric LVh EF 55 to 60%     H/O exercise stress test 3/18/16     Normal test. No ischemia or arrhythmia.     History of cardiac monitoring 16    Normal event monitor findings suggestive of SR with mild degree of sinus tachycardia on April 3.    HTN (hypertension)     Hx-TIA (transient ischemic attack) 3/11/2016    2016 [facility]. MRI negative    Hyperlipidemia     Major depression 2015    PAF (paroxysmal atrial fibrillation) ([facility] 3/11/2016    Telemetry strips 16 [facility]       Past Surgical History:        Procedure Laterality Date    BACK SURGERY  1996     SECTION      COLONOSCOPY      DILATION AND CURETTAGE OF UTERUS      HYSTERECTOMY  2017    WISDOM TOOTH EXTRACTION         Social History:    Social History     Tobacco Use    Smoking status: Never Smoker    Smokeless tobacco: Never Used   Substance Use Topics    Alcohol use: Yes     Comment: occasional,wine                                Counseling given: Not Answered      Vital Signs (Current):   Vitals:    04/07/22 0930   Weight: 164 lb (74.4 kg)   Height: 5' 1\" (1.549 m)                                              BP Readings from Last 3 Encounters:   09/20/21 124/78   08/28/21 136/66   10/14/18 (!) 142/77       NPO Status:                                                                                 BMI:   Wt Readings from Last 3 Encounters:   09/20/21 165 lb (74.8 kg)   08/28/21 166 lb 3.2 oz (75.4 kg)   10/14/18 173 lb (78.5 kg)     Body mass index is 30.99 kg/m². CBC:   Lab Results   Component Value Date    WBC 6.3 08/27/2021    RBC 3.24 08/27/2021    HGB 9.0 08/27/2021    HCT 27.4 08/27/2021    MCV 84.6 08/27/2021    RDW 14.6 08/27/2021     08/27/2021       CMP:   Lab Results   Component Value Date     08/27/2021    K 3.5 08/27/2021    CL 97 08/27/2021    CO2 23 08/27/2021    BUN 36 08/27/2021    CREATININE 1.3 08/27/2021    GFRAA 49 08/27/2021    LABGLOM 41 08/27/2021    GLUCOSE 119 08/27/2021    PROT 6.6 08/26/2021    CALCIUM 8.1 08/27/2021    BILITOT 0.4 08/26/2021    ALKPHOS 75 08/26/2021    AST 24 08/26/2021    ALT 24 08/26/2021       POC Tests: No results for input(s): POCGLU, POCNA, POCK, POCCL, POCBUN, POCHEMO, POCHCT in the last 72 hours.     Coags:   Lab Results   Component Value Date    PROTIME 12.7 08/27/2021    INR 1.02 08/27/2021    APTT 37.6 10/04/2017       HCG (If Applicable): No results found for: PREGTESTUR, PREGSERUM, HCG, HCGQUANT     ABGs: No results found for: PHART, PO2ART, YSQ1ETN, JYW0CXC, BEART, L4TKINLP     Type & Screen (If Applicable):  No results found for: LABABO, LABRH    Drug/Infectious Status (If Applicable):  No results found for: HIV, HEPCAB    COVID-19 Screening (If Applicable): No results found for: COVID19        Anesthesia Evaluation  Patient summary reviewed  Airway:         Dental:          Pulmonary: Cardiovascular:    (+) hypertension:, dysrhythmias: atrial fibrillation, CHURCH:, hyperlipidemia                  Neuro/Psych:   (+) TIA, depression/anxiety             GI/Hepatic/Renal:             Endo/Other:    (+) DiabetesType II DM, , blood dyscrasia: anticoagulation therapy, arthritis:., .                 Abdominal:             Vascular: Other Findings:             Anesthesia Plan      MAC     ASA 3     (Chart review 4/15/22)  Induction: intravenous. Anesthetic plan and risks discussed with patient. Use of blood products discussed with patient whom. Plan discussed with CRNA.                 JORGE LUIS Phelps - TIMOTHY   4/15/2022

## 2022-04-18 ENCOUNTER — ANESTHESIA (OUTPATIENT)
Dept: ENDOSCOPY | Age: 67
End: 2022-04-18
Payer: MEDICARE

## 2022-04-18 ENCOUNTER — HOSPITAL ENCOUNTER (OUTPATIENT)
Age: 67
Setting detail: OUTPATIENT SURGERY
Discharge: HOME | End: 2022-04-18
Attending: INTERNAL MEDICINE | Admitting: INTERNAL MEDICINE
Payer: MEDICARE

## 2022-04-18 VITALS — OXYGEN SATURATION: 97 % | DIASTOLIC BLOOD PRESSURE: 54 MMHG | SYSTOLIC BLOOD PRESSURE: 82 MMHG

## 2022-04-18 VITALS
OXYGEN SATURATION: 96 % | HEART RATE: 72 BPM | TEMPERATURE: 97 F | RESPIRATION RATE: 16 BRPM | BODY MASS INDEX: 30.96 KG/M2 | DIASTOLIC BLOOD PRESSURE: 54 MMHG | HEIGHT: 61 IN | WEIGHT: 164 LBS | SYSTOLIC BLOOD PRESSURE: 106 MMHG

## 2022-04-18 LAB — GLUCOSE BLD-MCNC: 136 MG/DL (ref 70–99)

## 2022-04-18 PROCEDURE — 6360000002 HC RX W HCPCS

## 2022-04-18 PROCEDURE — 7100000010 HC PHASE II RECOVERY - FIRST 15 MIN: Performed by: INTERNAL MEDICINE

## 2022-04-18 PROCEDURE — G0105 COLORECTAL SCRN; HI RISK IND: HCPCS | Performed by: INTERNAL MEDICINE

## 2022-04-18 PROCEDURE — 7100000011 HC PHASE II RECOVERY - ADDTL 15 MIN: Performed by: INTERNAL MEDICINE

## 2022-04-18 PROCEDURE — 82962 GLUCOSE BLOOD TEST: CPT

## 2022-04-18 PROCEDURE — 2580000003 HC RX 258: Performed by: ANESTHESIOLOGY

## 2022-04-18 PROCEDURE — 3700000000 HC ANESTHESIA ATTENDED CARE: Performed by: INTERNAL MEDICINE

## 2022-04-18 PROCEDURE — 2500000003 HC RX 250 WO HCPCS

## 2022-04-18 PROCEDURE — 3609027000 HC COLONOSCOPY: Performed by: INTERNAL MEDICINE

## 2022-04-18 PROCEDURE — 3700000001 HC ADD 15 MINUTES (ANESTHESIA): Performed by: INTERNAL MEDICINE

## 2022-04-18 PROCEDURE — 2709999900 HC NON-CHARGEABLE SUPPLY: Performed by: INTERNAL MEDICINE

## 2022-04-18 RX ORDER — LIDOCAINE HYDROCHLORIDE 20 MG/ML
INJECTION, SOLUTION EPIDURAL; INFILTRATION; INTRACAUDAL; PERINEURAL PRN
Status: DISCONTINUED | OUTPATIENT
Start: 2022-04-18 | End: 2022-04-18 | Stop reason: SDUPTHER

## 2022-04-18 RX ORDER — SODIUM CHLORIDE, SODIUM LACTATE, POTASSIUM CHLORIDE, CALCIUM CHLORIDE 600; 310; 30; 20 MG/100ML; MG/100ML; MG/100ML; MG/100ML
INJECTION, SOLUTION INTRAVENOUS CONTINUOUS
Status: DISCONTINUED | OUTPATIENT
Start: 2022-04-18 | End: 2022-04-18 | Stop reason: HOSPADM

## 2022-04-18 RX ORDER — PROPOFOL 10 MG/ML
INJECTION, EMULSION INTRAVENOUS PRN
Status: DISCONTINUED | OUTPATIENT
Start: 2022-04-18 | End: 2022-04-18 | Stop reason: SDUPTHER

## 2022-04-18 RX ADMIN — PROPOFOL 420 MG: 10 INJECTION, EMULSION INTRAVENOUS at 09:14

## 2022-04-18 RX ADMIN — LIDOCAINE HYDROCHLORIDE 100 MG: 20 INJECTION, SOLUTION EPIDURAL; INFILTRATION; INTRACAUDAL; PERINEURAL at 09:14

## 2022-04-18 RX ADMIN — SODIUM CHLORIDE, POTASSIUM CHLORIDE, SODIUM LACTATE AND CALCIUM CHLORIDE: 600; 310; 30; 20 INJECTION, SOLUTION INTRAVENOUS at 07:24

## 2022-04-18 ASSESSMENT — PAIN SCALES - GENERAL: PAINLEVEL_OUTOF10: 0

## 2022-04-18 ASSESSMENT — PAIN - FUNCTIONAL ASSESSMENT: PAIN_FUNCTIONAL_ASSESSMENT: 0-10

## 2022-04-18 NOTE — PROGRESS NOTES
Received report from Génesis prior to transfer to endo unit. She is alert and oriented, took Xarelto last on 4/1/22, she takes no beta blockers.  Consents are signed

## 2022-04-18 NOTE — BRIEF OP NOTE
Brief Postoperative Note      Patient: Buffy Mckeon  YOB: 1955  MRN: [id number]    Date of Procedure: 4/18/2022    Pre-Op Diagnosis: HX OF DIVERTICULITITS    Post-Op Diagnosis: pandiverticular disease. Procedure(s):  COLONOSCOPY DIAGNOSTIC    Surgeon(s):  Guillermo Chery MD    Assistant:  * No surgical staff found *    Anesthesia: Monitor Anesthesia Care    Estimated Blood Loss (mL): Minimal    Complications: None    Specimens:   * No specimens in log *    Implants:  * No implants in log *      Drains: * No LDAs found *    Findings: As above, repeat colonoscopy in ten years.      Electronically signed by Guillermo Chery MD on 4/18/2022 at 9:32 AM

## 2022-04-18 NOTE — PROGRESS NOTES
3096- Pt returned to SDS rm 13, respirations even and unlabored, VSS, report at bedside w/ Davon 7833 and beverage provided to pt, tolerated well  0955- discharge instructions reviewed w/ pt and pt daughter at bedside, no questions at this time   1000- pt dressing   1009- pt discharged via car w/ pt daughter driving

## 2022-04-18 NOTE — ANESTHESIA POSTPROCEDURE EVALUATION
Department of Anesthesiology  Postprocedure Note    Patient: Reinier Philippe  MRN: [id number]  [place]: 1955  Date of evaluation: 4/18/2022  Time:  9:34 AM     Procedure Summary     Date: 04/18/22 Room / Location: [address]    Anesthesia Start: 9167 Anesthesia Stop: 0933    Procedure: COLONOSCOPY DIAGNOSTIC (N/A ) Diagnosis: (HX OF DIVERTICULITITS)    Surgeons: New Atkins MD Responsible Provider: Brie Morin MD    Anesthesia Type: MAC ASA Status: 3          Anesthesia Type: MAC    Hussain Phase I:      Hussain Phase II:      Last vitals: Reviewed and per EMR flowsheets.        Anesthesia Post Evaluation    Patient location during evaluation: bedside  Patient participation: complete - patient participated  Level of consciousness: awake and alert  Pain score: 0  Airway patency: patent  Nausea & Vomiting: no nausea and no vomiting  Complications: no  Cardiovascular status: blood pressure returned to baseline  Respiratory status: acceptable, spontaneous ventilation and room air

## 2022-04-18 NOTE — H&P
HISTORY & PHYSICAL:  Patient's history with special attention to the cardiovascular, pulmonary systems and the current problem was reviewed with the patient immediately prior to the procedure. Medications, allergies, and pertinent laboratory tests were also reviewed at this time. The physical examination,as below, was then performed. Patient assessed to be ASA Class 2. Mallampati Airway Assessment: 2. History of adverse reactions involving sedation/anesthesia. None  Family history of adverse reaction to sedation/anesthesia. None      PHYSICAL EXAMINATION    BP (!) 145/89   Pulse 68   Temp 98 °F (36.7 °C) (Temporal)   Resp 16   Ht 5' 1\" (1.549 m)   Wt 164 lb (74.4 kg)   LMP 01/14/2015 (Approximate)   SpO2 96%   BMI 30.99 kg/m²     Mouth and Pharynx : Normal without focal findings  Cardiac: Regular rate and rhythm  Pulmonary: Clear bilaterally  Neurological: Normal without focal findings   Abdomen: Soft, non-tender, non-distended     Written informed consent obtained from patient. Risks (including but not limited to perforation, bloating and bleeding,) benefits and alternatives explained and questions answered. Patient verbalized understanding. Based on history and airway assessment patient is an appropriate candidate for  sedation.     Uyen Munoz MD  04/18/22

## 2022-04-21 NOTE — ANESTHESIA PRE PROCEDURE
Department of Anesthesiology  Preprocedure Note       Name:  Marshall Ortiz   Age:  74 y.o.  :  1955                                          MRN:  [id number]         Date:  2022      Surgeon: Mirlande Kim):  Ananya Woodard MD    Procedure: Procedure(s):  COLONOSCOPY DIAGNOSTIC    Medications prior to admission:   Prior to Admission medications    Medication Sig Start Date End Date Taking? Authorizing Provider   bisacodyl (BISACODYL) 5 MG EC tablet Take 4 tablets once for colonoscopy prep 21   JORGE LUIS Rincon - CNP   Wheat Dextrin (BENEFIBER) POWD Take 4 g by mouth 3 times daily (with meals) 21   Heidi Oliva MD   hydrochlorothiazide (HYDRODIURIL) 12.5 MG tablet Take 1 tablet by mouth daily 10/17/16   Shraddha Montez MD   sertraline (ZOLOFT) 50 MG tablet Take 50 mg by mouth daily    Historical Provider, MD   rivaroxaban (XARELTO) 20 MG TABS tablet Take 20 mg by mouth    Historical Provider, MD   lisinopril (PRINIVIL;ZESTRIL) 40 MG tablet Take 1 tablet by mouth daily. 2/9/15   Renata Juarez MD   metFORMIN (GLUCOPHAGE) 850 MG tablet Take 1 tablet by mouth 2 times daily (with meals). 2/9/15   Renata Juarez MD   atorvastatin (LIPITOR) 40 MG tablet Take 1 tablet by mouth daily. 2/9/15   Renata Juarez MD       Current medications:    No current facility-administered medications for this encounter. Current Outpatient Medications   Medication Sig Dispense Refill    bisacodyl (BISACODYL) 5 MG EC tablet Take 4 tablets once for colonoscopy prep 4 tablet 0    Wheat Dextrin (BENEFIBER) POWD Take 4 g by mouth 3 times daily (with meals) 350 g 1    hydrochlorothiazide (HYDRODIURIL) 12.5 MG tablet Take 1 tablet by mouth daily 30 tablet 6    sertraline (ZOLOFT) 50 MG tablet Take 50 mg by mouth daily      rivaroxaban (XARELTO) 20 MG TABS tablet Take 20 mg by mouth      lisinopril (PRINIVIL;ZESTRIL) 40 MG tablet Take 1 tablet by mouth daily.  30 tablet 2    metFORMIN (GLUCOPHAGE) 850 MG tablet Take 1 tablet by mouth 2 times daily (with meals). 60 tablet 2    atorvastatin (LIPITOR) 40 MG tablet Take 1 tablet by mouth daily. 30 tablet 2       Allergies: Allergies   Allergen Reactions    Cephalexin Rash    Phenazopyridine Rash       Problem List:    Patient Active Problem List   Diagnosis Code    DM2 (diabetes mellitus, type 2) ([facility] E11.9    Hyperlipidemia E78.5    HTN (hypertension) I10    Major depression F32.9    PAF (paroxysmal [facility] fibrillation) ([facility] I48.0    Hx-TIA (transient ischemic attack) Z86.73    CHURCH (dyspnea on exertion) R06.00    Chronic fatigue R53.82    Acetylcholine overdose T44.1X1A    DM2 (diabetes mellitus, type 2) ([facility]) E11.9    Acute diverticulitis K57.92    Screening for colorectal cancer Z12.11, Z12.12       Past Medical History:        Diagnosis Date    Arthritis     left ankle    Cataract     DM2 (diabetes mellitus, type 2) ([facility]     H/O Doppler ultrasound 16    Carotid-Normal. No evidence for hemodynamically significant flow disturbance.  H/O echocardiogram 16    Normal left ventricular systolic function. Mild concentric LVh EF 55 to 60%     H/O exercise stress test 3/18/16     Normal test. No ischemia or arrhythmia.     History of cardiac monitoring 16    Normal event monitor findings suggestive of SR with mild degree of sinus tachycardia on April 3.    HTN (hypertension)     Hx-TIA (transient ischemic attack) 3/11/2016    2016 [facility]. MRI negative    Hyperlipidemia     Major depression 2015    PAF (paroxysmal atrial fibrillation) ([facility] 3/11/2016    Telemetry strips 16 [facility]       Past Surgical History:        Procedure Laterality Date    BACK SURGERY  1996     SECTION      COLONOSCOPY      COLONOSCOPY N/A 2022    COLONOSCOPY DIAGNOSTIC performed by [facility] at [facility]  2017    WISDOM TOOTH EXTRACTION Social History:    Social History     Tobacco Use    Smoking status: Never Smoker    Smokeless tobacco: Never Used   Substance Use Topics    Alcohol use: Yes     Comment: occasional,wine                                Counseling given: Not Answered      Vital Signs (Current):   Vitals:    04/07/22 0930 04/18/22 0710 04/18/22 0940 04/18/22 1000   BP:  (!) 145/89 104/64 (!) 106/54   Pulse:  68 65 72   Resp:  16 18 16   Temp:  98 °F (36.7 °C) 97.6 °F (36.4 °C) 97 °F (36.1 °C)   TempSrc:  Temporal Temporal Temporal   SpO2:  96% 96% 96%   Weight: 164 lb (74.4 kg)      Height: 5' 1\" (1.549 m)                                                 BP Readings from Last 3 Encounters:   04/18/22 (!) 82/54   04/18/22 (!) 106/54   09/20/21 124/78       NPO Status: Time of last liquid consumption: 0530                        Time of last solid consumption: 1700                        Date of last liquid consumption: 04/18/22                        Date of last solid food consumption: 04/16/22    BMI:   Wt Readings from Last 3 Encounters:   04/07/22 164 lb (74.4 kg)   09/20/21 165 lb (74.8 kg)   08/28/21 166 lb 3.2 oz (75.4 kg)     Body mass index is 30.99 kg/m². CBC:   Lab Results   Component Value Date    WBC 6.3 08/27/2021    RBC 3.24 08/27/2021    HGB 9.0 08/27/2021    HCT 27.4 08/27/2021    MCV 84.6 08/27/2021    RDW 14.6 08/27/2021     08/27/2021       CMP:   Lab Results   Component Value Date     08/27/2021    K 3.5 08/27/2021    CL 97 08/27/2021    CO2 23 08/27/2021    BUN 36 08/27/2021    CREATININE 1.3 08/27/2021    GFRAA 49 08/27/2021    LABGLOM 41 08/27/2021    GLUCOSE 119 08/27/2021    PROT 6.6 08/26/2021    CALCIUM 8.1 08/27/2021    BILITOT 0.4 08/26/2021    ALKPHOS 75 08/26/2021    AST 24 08/26/2021    ALT 24 08/26/2021       POC Tests: No results for input(s): POCGLU, POCNA, POCK, POCCL, POCBUN, POCHEMO, POCHCT in the last 72 hours.     Coags:   Lab Results   Component Value Date    PROTIME 12.7 08/27/2021    INR 1.02 08/27/2021    APTT 37.6 10/04/2017       HCG (If Applicable): No results found for: PREGTESTUR, PREGSERUM, HCG, HCGQUANT     ABGs: No results found for: PHART, PO2ART, REJ6KLE, SCC2CNM, BEART, Z0BYRNIC     Type & Screen (If Applicable):  No results found for: LABABO, LABRH    Drug/Infectious Status (If Applicable):  No results found for: HIV, HEPCAB    COVID-19 Screening (If Applicable): No results found for: COVID19        Anesthesia Evaluation   no history of anesthetic complications:   Airway: Mallampati: II        Dental: normal exam         Pulmonary:normal exam                               Cardiovascular:  Exercise tolerance: good (>4 METS),   (+) hypertension:, CHURCH:, hyperlipidemia         Beta Blocker:  Not on Beta Blocker         Neuro/Psych:   (+) depression/anxiety             GI/Hepatic/Renal:             Endo/Other:    (+) Diabetes, . Abdominal:             Vascular:   + PVD, aortic or cerebral, . Other Findings:             Anesthesia Plan      MAC     ASA 3             Anesthetic plan and risks discussed with patient.                       Mark Yanez MD   4/21/2022

## (undated) DEVICE — Z DISCONTINUED NO SUB IDED TUBING ETCO2 AD L6.5FT NSL ORAL CVD PRNG NONFLARED TIP OVR